# Patient Record
Sex: MALE | Race: WHITE | Employment: OTHER | URBAN - METROPOLITAN AREA
[De-identification: names, ages, dates, MRNs, and addresses within clinical notes are randomized per-mention and may not be internally consistent; named-entity substitution may affect disease eponyms.]

---

## 2019-01-30 ENCOUNTER — HOSPITAL ENCOUNTER (OUTPATIENT)
Age: 69
Setting detail: OUTPATIENT SURGERY
Discharge: HOME | End: 2019-01-30
Attending: INTERNAL MEDICINE | Admitting: INTERNAL MEDICINE
Payer: MEDICARE

## 2019-01-30 ENCOUNTER — ANESTHESIA (OUTPATIENT)
Dept: ENDOSCOPY | Age: 69
End: 2019-01-30
Payer: MEDICARE

## 2019-01-30 ENCOUNTER — ANESTHESIA EVENT (OUTPATIENT)
Dept: ENDOSCOPY | Age: 69
End: 2019-01-30
Payer: MEDICARE

## 2019-01-30 VITALS
HEART RATE: 66 BPM | DIASTOLIC BLOOD PRESSURE: 64 MMHG | BODY MASS INDEX: 29.58 KG/M2 | OXYGEN SATURATION: 96 % | RESPIRATION RATE: 23 BRPM | TEMPERATURE: 97.5 F | WEIGHT: 218.38 LBS | HEIGHT: 72 IN | SYSTOLIC BLOOD PRESSURE: 122 MMHG

## 2019-01-30 PROCEDURE — 76040000019: Performed by: INTERNAL MEDICINE

## 2019-01-30 PROCEDURE — 74011250636 HC RX REV CODE- 250/636: Performed by: INTERNAL MEDICINE

## 2019-01-30 PROCEDURE — 74011250636 HC RX REV CODE- 250/636

## 2019-01-30 PROCEDURE — 76060000031 HC ANESTHESIA FIRST 0.5 HR: Performed by: INTERNAL MEDICINE

## 2019-01-30 RX ORDER — LIDOCAINE HYDROCHLORIDE 20 MG/ML
INJECTION, SOLUTION EPIDURAL; INFILTRATION; INTRACAUDAL; PERINEURAL AS NEEDED
Status: DISCONTINUED | OUTPATIENT
Start: 2019-01-30 | End: 2019-01-30 | Stop reason: HOSPADM

## 2019-01-30 RX ORDER — PROPOFOL 10 MG/ML
INJECTION, EMULSION INTRAVENOUS AS NEEDED
Status: DISCONTINUED | OUTPATIENT
Start: 2019-01-30 | End: 2019-01-30 | Stop reason: HOSPADM

## 2019-01-30 RX ORDER — FLUMAZENIL 0.1 MG/ML
0.2 INJECTION INTRAVENOUS
Status: CANCELLED | OUTPATIENT
Start: 2019-01-30 | End: 2019-01-30

## 2019-01-30 RX ORDER — SODIUM CHLORIDE 0.9 % (FLUSH) 0.9 %
5-40 SYRINGE (ML) INJECTION EVERY 8 HOURS
Status: CANCELLED | OUTPATIENT
Start: 2019-01-30

## 2019-01-30 RX ORDER — SODIUM CHLORIDE 0.9 % (FLUSH) 0.9 %
5-40 SYRINGE (ML) INJECTION AS NEEDED
Status: CANCELLED | OUTPATIENT
Start: 2019-01-30

## 2019-01-30 RX ORDER — SODIUM CHLORIDE 0.9 % (FLUSH) 0.9 %
5-40 SYRINGE (ML) INJECTION EVERY 8 HOURS
Status: DISCONTINUED | OUTPATIENT
Start: 2019-01-30 | End: 2019-01-30 | Stop reason: HOSPADM

## 2019-01-30 RX ORDER — FENTANYL CITRATE 50 UG/ML
50 INJECTION, SOLUTION INTRAMUSCULAR; INTRAVENOUS
Status: CANCELLED | OUTPATIENT
Start: 2019-01-30 | End: 2019-01-30

## 2019-01-30 RX ORDER — SODIUM CHLORIDE 0.9 % (FLUSH) 0.9 %
5-40 SYRINGE (ML) INJECTION AS NEEDED
Status: DISCONTINUED | OUTPATIENT
Start: 2019-01-30 | End: 2019-01-30 | Stop reason: HOSPADM

## 2019-01-30 RX ORDER — SODIUM CHLORIDE 9 MG/ML
INJECTION, SOLUTION INTRAVENOUS
Status: DISCONTINUED | OUTPATIENT
Start: 2019-01-30 | End: 2019-01-30 | Stop reason: HOSPADM

## 2019-01-30 RX ORDER — CARVEDILOL 12.5 MG/1
TABLET ORAL 2 TIMES DAILY WITH MEALS
COMMUNITY

## 2019-01-30 RX ORDER — FLECAINIDE ACETATE 100 MG/1
100 TABLET ORAL 2 TIMES DAILY
COMMUNITY

## 2019-01-30 RX ORDER — SODIUM CHLORIDE 9 MG/ML
100 INJECTION, SOLUTION INTRAVENOUS CONTINUOUS
Status: DISCONTINUED | OUTPATIENT
Start: 2019-01-30 | End: 2019-01-30 | Stop reason: HOSPADM

## 2019-01-30 RX ORDER — NALOXONE HYDROCHLORIDE 0.4 MG/ML
0.4 INJECTION, SOLUTION INTRAMUSCULAR; INTRAVENOUS; SUBCUTANEOUS
Status: CANCELLED | OUTPATIENT
Start: 2019-01-30 | End: 2019-01-30

## 2019-01-30 RX ORDER — FLUMAZENIL 0.1 MG/ML
0.2 INJECTION INTRAVENOUS
Status: DISCONTINUED | OUTPATIENT
Start: 2019-01-30 | End: 2019-01-30 | Stop reason: HOSPADM

## 2019-01-30 RX ORDER — ATROPINE SULFATE 0.1 MG/ML
0.5 INJECTION INTRAVENOUS
Status: DISCONTINUED | OUTPATIENT
Start: 2019-01-30 | End: 2019-01-30 | Stop reason: HOSPADM

## 2019-01-30 RX ORDER — MIDAZOLAM HYDROCHLORIDE 1 MG/ML
.25-5 INJECTION, SOLUTION INTRAMUSCULAR; INTRAVENOUS
Status: CANCELLED | OUTPATIENT
Start: 2019-01-30 | End: 2019-01-30

## 2019-01-30 RX ORDER — DEXTROMETHORPHAN/PSEUDOEPHED 2.5-7.5/.8
1.2 DROPS ORAL
Status: DISCONTINUED | OUTPATIENT
Start: 2019-01-30 | End: 2019-01-30 | Stop reason: HOSPADM

## 2019-01-30 RX ADMIN — SODIUM CHLORIDE: 9 INJECTION, SOLUTION INTRAVENOUS at 10:59

## 2019-01-30 RX ADMIN — LIDOCAINE HYDROCHLORIDE 50 MG: 20 INJECTION, SOLUTION EPIDURAL; INFILTRATION; INTRACAUDAL; PERINEURAL at 11:03

## 2019-01-30 RX ADMIN — PROPOFOL 40 MG: 10 INJECTION, EMULSION INTRAVENOUS at 11:09

## 2019-01-30 RX ADMIN — PROPOFOL 30 MG: 10 INJECTION, EMULSION INTRAVENOUS at 11:11

## 2019-01-30 RX ADMIN — SODIUM CHLORIDE 100 ML/HR: 900 INJECTION, SOLUTION INTRAVENOUS at 10:58

## 2019-01-30 RX ADMIN — PROPOFOL 60 MG: 10 INJECTION, EMULSION INTRAVENOUS at 11:07

## 2019-01-30 RX ADMIN — PROPOFOL 70 MG: 10 INJECTION, EMULSION INTRAVENOUS at 11:04

## 2019-01-30 RX ADMIN — PROPOFOL 30 MG: 10 INJECTION, EMULSION INTRAVENOUS at 11:06

## 2019-01-30 RX ADMIN — PROPOFOL 30 MG: 10 INJECTION, EMULSION INTRAVENOUS at 11:14

## 2019-01-30 NOTE — PROGRESS NOTES
..Anesthesia reports 260mg Propofol, 5mg Lidocaine and 400mL NS given during procedure. Received report from anesthesia staff on vital signs and status of patient.

## 2019-01-30 NOTE — ANESTHESIA POSTPROCEDURE EVALUATION
Procedure(s): 
COLONOSCOPY. Anesthesia Post Evaluation Patient location during evaluation: PACU Note status: Adequate. Level of consciousness: responsive to verbal stimuli and sleepy but conscious Pain management: satisfactory to patient Airway patency: patent Anesthetic complications: no 
Cardiovascular status: acceptable Respiratory status: acceptable Hydration status: acceptable Comments: +Post-Anesthesia Evaluation and Assessment Patient: Brandy Butler MRN: [id number]  SSN: [id number] YOB: 1950  Age: 70 y.o. Sex: male Cardiovascular Function/Vital Signs /64   Pulse 66   Temp 36.4 °C (97.5 °F)   Resp 23   Ht 6' (1.829 m)   Wt 99.1 kg (218 lb 6 oz)   SpO2 96%   BMI 29.62 kg/m² Patient is status post Procedure(s): 
COLONOSCOPY. Nausea/Vomiting: Controlled. Postoperative hydration reviewed and adequate. Pain: 
Pain Scale 1: Numeric (0 - 10) (01/30/19 1141) Pain Intensity 1: 0 (01/30/19 1141) Managed. Neurological Status: At baseline. Mental Status and Level of Consciousness: Arousable. Pulmonary Status:  
O2 Device: Room air (01/30/19 1143) Adequate oxygenation and airway patent. Complications related to anesthesia: None Post-anesthesia assessment completed. No concerns. Signed By: Luz Hemphill MD  
 1/30/2019 Post anesthesia nausea and vomiting:  controlled Visit Vitals /64 Pulse 66 Temp 36.4 °C (97.5 °F) Resp 23 Ht 6' (1.829 m) Wt 99.1 kg (218 lb 6 oz) SpO2 96% BMI 29.62 kg/m²

## 2019-01-30 NOTE — DISCHARGE INSTRUCTIONS
Arslan Toribio MD  [facility], [address]  [place], [address] [facility]  [phone]  [email or web address]  [id number]  1950    COLON DISCHARGE INSTRUCTIONS  Discomfort:  Redness at IV site- apply warm compress to area; if redness or soreness persist- contact your physician  There may be a slight amount of blood passed from the rectum  Gaseous discomfort- walking, belching will help relieve any discomfort  You may not operate a vehicle for 12 hours  You may not engage in an occupation involving machinery or appliances for rest of today  You may not drink alcoholic beverages for at least 12 hours  Avoid making any critical decisions for at least 24 hour  DIET:   High fiber diet. - however -  remember your colon is empty and a heavy meal will produce gas. Avoid these foods:  vegetables, fried / greasy foods, carbonated drinks for today      ACTIVITY:  You may resume your normal daily activities it is recommended that you spend the remainder of the day resting -  avoid any strenuous activity. CALL M.D. ANY SIGN OF:   Increasing pain, nausea, vomiting  Abdominal distension (swelling)  New increased bleeding (oral or rectal)  Fever (chills)     COLONOSCOPY FINDINGS:  Your colonoscopy showed: diverticulosis and hemorrhoids. NO polyps found. Follow-up Instructions:   Call Dr. Arslan Toribio if any questions or problems. Telephone # [phone]    Should have a repeat colonoscopy in 5 years.

## 2019-01-30 NOTE — ROUTINE PROCESS
Rae Kaur Olaf 1950 
[id number] Situation: 
Verbal report received from: Ann Marie Duncan RN Procedure: Procedure(s): 
COLONOSCOPY Background: 
 
Preoperative diagnosis: ANTICOAGULANT LONG TERM USE, FAMILY HISTORY GI TRACT CANCER Postoperative diagnosis: diverticulosis :  Dr. Cassi Seals Assistant(s): Endoscopy Technician-1: [facility] Endoscopy RN-1: Hayes Purcell RN Specimens: * No specimens in log * H. Pylori  no Assessment: 
Intra-procedure medications Anesthesia gave intra-procedure sedation and medications, see anesthesia flow sheet Intravenous fluids: NS@ [facility] Vital signs stable Abdominal assessment: round and soft Recommendation: 
Discharge patient per MD order. Family or Friend Permission to share finding with family or friend yes

## 2019-01-30 NOTE — PROCEDURES
[facility]                  Colonoscopy Operative Report    1/30/2019      Antonio Dickens  [id number]  1950    Procedure Type:   Colonoscopy --screening     Indications:    Family history of coloretal cancer (screening only)     Pre-operative Diagnosis: see indication above    Post-operative Diagnosis:  See findings below    :  Alexia Kent MD    Referring Provider: Russel Baez MD      Sedation:  MAC anesthesia Propofol    Pre-Procedural Exam:      Airway: clear,  No airway problems anticipated  Heart: RRR, without gallops or rubs  Lungs: clear bilaterally without wheezes, crackles, or rhonchi  Abdomen: soft, nontender, nondistended, bowel sounds present  Mental Status: awake, alert and oriented to person, place and time     Procedure Details:  After informed consent was obtained with all risks and benefits of procedure explained and preoperative exam completed, the patient was taken to the endoscopy suite and placed in the left lateral decubitus position. Upon sequential sedation as per above, a digital rectal exam was performed . The Olympus videocolonoscope  was inserted in the rectum and carefully advanced to the cecum, which was identified by the ileocecal valve and appendiceal orifice. The cecum was identified by the ileocecal valve and appendiceal orifice. The quality of preparation was good. The colonoscope was slowly withdrawn with careful evaluation between folds. Retroflexion in the rectum was completed demonstrating internal hemorrhoids. Findings:   Rectum: Grade 2 internal hemorrhoid(s); Sigmoid:     - Diverticulosis  Descending Colon:     - Diverticulosis  Transverse Colon:     - Diverticulosis  Ascending Colon:     - Diverticulosis  Cecum: normal  Terminal Ileum: not intubated      Specimen Removed:  none    Complications: None. EBL:  None.     Impression:    diverticulosis,  Severe in degree, involving the entire colon  hemorrhoids internal, Large in size    Recommendations: --Repeat colonoscopy in 5 years. High fiber diet. Resume normal medication(s). Discharge Disposition:  Home in the company of a  when able to ambulate. Tremaine Gaitan MD    1/30/2019     ALFREDA Beyer MD  [facility], [address] [facility]  [address]  [phone]  [email or web address]

## 2019-01-30 NOTE — H&P
Marta Petty MD 
[facility], [address] [place] [address], [address] 
[phone] 
[email or web address] Outpatient History and Physical 
 
Patient: Maya Alvarado Physician: Porfirio Flores MD 
 
Vital Signs: Blood pressure 167/84, pulse 69, temperature 97.2 °F (36.2 °C), resp. rate 18, height 6' (1.829 m), weight 99.1 kg (218 lb 6 oz), SpO2 96 %. Allergies: Allergies Allergen Reactions  Amiodarone Unknown (comments) Chief Complaint: Screening colonoscopy History of Present Illness: Here for a screening colonoscopy. Last colonoscopy was  and showed no polyps. Currently has no GI symptoms. Positive FH of colon cancer in his father. History: 
Past Medical History:  
Diagnosis Date  A-fib ([address]  Cancer ([facility] [address] skin ca  Diabetes ([facility] [address]  GERD (gastroesophageal reflux disease)  Gout  High cholesterol  Hypertension  Obesity  Unspecified sleep apnea POSSIBLE SLEEP APNEA Past Surgical History:  
Procedure Laterality Date  CARDIAC SURG PROCEDURE UNLIST    
 cardiac ablation  HX CATARACT REMOVAL    
 BILATERAL  
 HX GI    
 esophageal dilitation  HX GI    
 COLONSCOPY  
 HX HERNIA REPAIR UMBILICAL HERNIA  
 HX TONSILLECTOMY  UPPER GI ENDOSCOPY,BIOPSY  2015 Social History Socioeconomic History  Marital status:  Spouse name: Not on file  Number of children: Not on file  Years of education: Not on file  Highest education level: Not on file Tobacco Use  Smoking status: Former Smoker Packs/day: 1.50 Years: 25.00 Pack years: 37.50 Last attempt to quit: 1998 Years since quittin.5  Smokeless tobacco: Never Used Substance and Sexual Activity  Alcohol use: Yes Comment: OCCASIONAL BEER  
 Drug use: No  
  
Family History Problem Relation Age of Onset  Heart Disease Mother CAD,STENT  
 Hypertension Mother  Cancer Father STOMACH CA, COLON CA, SKIN CA  
 Diabetes Father  Heart Disease Father CABG  Hypertension Father  Hypertension Sister  Stroke Paternal Grandmother Patient Active Problem List  
Diagnosis Code  
 HTN (hypertension) I10  
 HLD (hyperlipidemia) E78.5  Gout M10.9  Esophageal reflux K21.9  BRBPR (bright red blood per rectum) K62.5  Diverticulosis of colon with hemorrhage K57.31  
 Acute blood loss anemia D62  Atrial fibrillation, persistent (HCC) I48.1 Medications:  
Prior to Admission medications Medication Sig Start Date End Date Taking? Authorizing Provider  
carvedilol (COREG) 12.5 mg tablet Take  by mouth two (2) times daily (with meals). Yes Provider, Historical  
flecainide (TAMBOCOR) 100 mg tablet Take 100 mg by mouth two (2) times a day. Yes Provider, Historical  
atorvastatin (LIPITOR) 40 mg tablet Take  by mouth daily. Yes Provider, Historical  
irbesartan (AVAPRO) 300 mg tablet Take 300 mg by mouth daily. Yes Other, MD Marianne  
allopurinol (ZYLOPRIM) 300 mg tablet Take 300 mg by mouth daily. Yes Other, MD Marianne  
omeprazole (PRILOSEC) 20 mg capsule Take 20 mg by mouth daily. Yes Other, MD Marianne  
metFORMIN (GLUCOPHAGE) 500 mg tablet Take 500 mg by mouth two (2) times daily (with meals). Yes Other, MD Marianne  
pioglitazone (ACTOS) 15 mg tablet Take 15 mg by mouth nightly. Yes Provider, Historical  
multivitamin (MULTIPLE VITAMIN) tablet Take 1 Tab by mouth daily. Yes Provider, Historical  
oxyCODONE-acetaminophen (PERCOCET 7.5) 7.5-325 mg per tablet Take 1 Tab by mouth every six (6) hours as needed. Max Daily Amount: 4 Tabs. 3/4/16   Richy Marie MD  
warfarin (COUMADIN) 4 mg tablet Take 4 mg by mouth daily. Provider, Historical  
 
 
Physical Exam:  
 
General: well developed, well nourished HEENT: unremarkable Heart: regular rhythm no mumur Lungs: clear Abdominal:  benign Neurological: unremarkable Extremities: no edema Findings/Diagnosis: Screening colonoscopy. FH of colon cancer. Plan of Care/Planned Procedure: Colonoscopy with monitored anesthesia care sedation Signed: 
Brian Ramirez MD 1/30/2019

## 2024-09-10 ENCOUNTER — HOSPITAL ENCOUNTER (OUTPATIENT)
Facility: HOSPITAL | Age: 74
Discharge: HOME | End: 2024-09-13

## 2024-09-10 DIAGNOSIS — G44.86 CERVICOGENIC HEADACHE: ICD-10-CM

## 2024-09-10 DIAGNOSIS — M54.2 CERVICALGIA: ICD-10-CM

## 2024-09-10 DIAGNOSIS — M50.30 DDD (DEGENERATIVE DISC DISEASE), CERVICAL: ICD-10-CM

## 2024-10-06 ENCOUNTER — HOSPITAL ENCOUNTER (OUTPATIENT)
Facility: HOSPITAL | Age: 74
Discharge: HOME | End: 2024-10-09
Payer: MEDICARE

## 2024-10-06 DIAGNOSIS — G44.86 CERVICOGENIC HEADACHE: ICD-10-CM

## 2024-10-06 DIAGNOSIS — M50.30 DDD (DEGENERATIVE DISC DISEASE), CERVICAL: ICD-10-CM

## 2024-10-06 DIAGNOSIS — M54.2 CERVICALGIA: ICD-10-CM

## 2024-10-06 DIAGNOSIS — M50.30 OTHER CERVICAL DISC DEGENERATION, UNSPECIFIED CERVICAL REGION: ICD-10-CM

## 2024-10-06 PROCEDURE — 72125 CT NECK SPINE W/O DYE: CPT

## 2025-02-04 ENCOUNTER — HOSPITAL ENCOUNTER (OUTPATIENT)
Facility: HOSPITAL | Age: 75
Discharge: HOME | End: 2025-02-07
Payer: MEDICARE

## 2025-02-04 DIAGNOSIS — M47.812 CERVICAL SPONDYLOSIS: ICD-10-CM

## 2025-02-04 PROCEDURE — 72141 MRI NECK SPINE W/O DYE: CPT

## 2025-04-10 ENCOUNTER — APPOINTMENT (OUTPATIENT)
Facility: HOSPITAL | Age: 75
DRG: 683 | End: 2025-04-10
Payer: MEDICARE

## 2025-04-10 ENCOUNTER — HOSPITAL ENCOUNTER (EMERGENCY)
Facility: HOSPITAL | Age: 75
Discharge: HOME | DRG: 683 | End: 2025-04-10
Attending: STUDENT IN AN ORGANIZED HEALTH CARE EDUCATION/TRAINING PROGRAM
Payer: MEDICARE

## 2025-04-10 VITALS
HEART RATE: 70 BPM | SYSTOLIC BLOOD PRESSURE: 165 MMHG | OXYGEN SATURATION: 93 % | WEIGHT: 225.09 LBS | RESPIRATION RATE: 20 BRPM | BODY MASS INDEX: 30.49 KG/M2 | HEIGHT: 72 IN | TEMPERATURE: 98.6 F | DIASTOLIC BLOOD PRESSURE: 76 MMHG

## 2025-04-10 DIAGNOSIS — K44.9 HIATAL HERNIA: ICD-10-CM

## 2025-04-10 DIAGNOSIS — R11.0 NAUSEA: Primary | ICD-10-CM

## 2025-04-10 LAB
ALBUMIN SERPL-MCNC: 3.7 G/DL (ref 3.5–5)
ALBUMIN/GLOB SERPL: 0.9 (ref 1.1–2.2)
ALP SERPL-CCNC: 83 U/L (ref 45–117)
ALT SERPL-CCNC: 31 U/L (ref 12–78)
ANION GAP SERPL CALC-SCNC: 4 MMOL/L (ref 2–12)
APPEARANCE UR: CLEAR
AST SERPL-CCNC: 28 U/L (ref 15–37)
BACTERIA URNS QL MICRO: NEGATIVE /HPF
BASOPHILS # BLD: 0.02 K/UL (ref 0–0.1)
BASOPHILS NFR BLD: 0.4 % (ref 0–1)
BILIRUB SERPL-MCNC: 0.7 MG/DL (ref 0.2–1)
BILIRUB UR QL: NEGATIVE
BUN SERPL-MCNC: 14 MG/DL (ref 6–20)
BUN/CREAT SERPL: 11 (ref 12–20)
CALCIUM SERPL-MCNC: 9.6 MG/DL (ref 8.5–10.1)
CHLORIDE SERPL-SCNC: 99 MMOL/L (ref 97–108)
CO2 SERPL-SCNC: 29 MMOL/L (ref 21–32)
COLOR UR: ABNORMAL
CREAT SERPL-MCNC: 1.32 MG/DL (ref 0.7–1.3)
DIFFERENTIAL METHOD BLD: ABNORMAL
EOSINOPHIL # BLD: 0.08 K/UL (ref 0–0.4)
EOSINOPHIL NFR BLD: 1.4 % (ref 0–7)
EPITH CASTS URNS QL MICRO: ABNORMAL /LPF
ERYTHROCYTE [DISTWIDTH] IN BLOOD BY AUTOMATED COUNT: 13.9 % (ref 11.5–14.5)
GLOBULIN SER CALC-MCNC: 3.9 G/DL (ref 2–4)
GLUCOSE SERPL-MCNC: 121 MG/DL (ref 65–100)
GLUCOSE UR STRIP.AUTO-MCNC: NEGATIVE MG/DL
HCT VFR BLD AUTO: 35.7 % (ref 36.6–50.3)
HGB BLD-MCNC: 11.4 G/DL (ref 12.1–17)
HGB UR QL STRIP: NEGATIVE
HYALINE CASTS URNS QL MICRO: ABNORMAL /LPF (ref 0–2)
IMM GRANULOCYTES # BLD AUTO: 0.02 K/UL (ref 0–0.04)
IMM GRANULOCYTES NFR BLD AUTO: 0.4 % (ref 0–0.5)
KETONES UR QL STRIP.AUTO: NEGATIVE MG/DL
LEUKOCYTE ESTERASE UR QL STRIP.AUTO: NEGATIVE
LIPASE SERPL-CCNC: 19 U/L (ref 13–75)
LYMPHOCYTES # BLD: 1.09 K/UL (ref 0.8–3.5)
LYMPHOCYTES NFR BLD: 19.2 % (ref 12–49)
MAGNESIUM SERPL-MCNC: 1.6 MG/DL (ref 1.6–2.4)
MCH RBC QN AUTO: 30.1 PG (ref 26–34)
MCHC RBC AUTO-ENTMCNC: 31.9 G/DL (ref 30–36.5)
MCV RBC AUTO: 94.2 FL (ref 80–99)
MONOCYTES # BLD: 0.36 K/UL (ref 0–1)
MONOCYTES NFR BLD: 6.3 % (ref 5–13)
NEUTS SEG # BLD: 4.11 K/UL (ref 1.8–8)
NEUTS SEG NFR BLD: 72.3 % (ref 32–75)
NITRITE UR QL STRIP.AUTO: NEGATIVE
NRBC # BLD: 0 K/UL (ref 0–0.01)
NRBC BLD-RTO: 0 PER 100 WBC
PH UR STRIP: 7 (ref 5–8)
PLATELET # BLD AUTO: 179 K/UL (ref 150–400)
PMV BLD AUTO: 10.7 FL (ref 8.9–12.9)
POTASSIUM SERPL-SCNC: 3.9 MMOL/L (ref 3.5–5.1)
PROT SERPL-MCNC: 7.6 G/DL (ref 6.4–8.2)
PROT UR STRIP-MCNC: 100 MG/DL
RBC # BLD AUTO: 3.79 M/UL (ref 4.1–5.7)
RBC #/AREA URNS HPF: ABNORMAL /HPF (ref 0–5)
SODIUM SERPL-SCNC: 132 MMOL/L (ref 136–145)
SP GR UR REFRACTOMETRY: 1.01
URINE CULTURE IF INDICATED: ABNORMAL
UROBILINOGEN UR QL STRIP.AUTO: 1 EU/DL (ref 0.2–1)
WBC # BLD AUTO: 5.7 K/UL (ref 4.1–11.1)
WBC URNS QL MICRO: ABNORMAL /HPF (ref 0–4)

## 2025-04-10 PROCEDURE — 6360000002 HC RX W HCPCS: Performed by: STUDENT IN AN ORGANIZED HEALTH CARE EDUCATION/TRAINING PROGRAM

## 2025-04-10 PROCEDURE — 83690 ASSAY OF LIPASE: CPT

## 2025-04-10 PROCEDURE — 74177 CT ABD & PELVIS W/CONTRAST: CPT

## 2025-04-10 PROCEDURE — 81001 URINALYSIS AUTO W/SCOPE: CPT

## 2025-04-10 PROCEDURE — 83735 ASSAY OF MAGNESIUM: CPT

## 2025-04-10 PROCEDURE — 85025 COMPLETE CBC W/AUTO DIFF WBC: CPT

## 2025-04-10 PROCEDURE — 80053 COMPREHEN METABOLIC PANEL: CPT

## 2025-04-10 PROCEDURE — 36415 COLL VENOUS BLD VENIPUNCTURE: CPT

## 2025-04-10 PROCEDURE — 6360000004 HC RX CONTRAST MEDICATION: Performed by: STUDENT IN AN ORGANIZED HEALTH CARE EDUCATION/TRAINING PROGRAM

## 2025-04-10 RX ORDER — IOPAMIDOL 755 MG/ML
100 INJECTION, SOLUTION INTRAVASCULAR
Status: COMPLETED | OUTPATIENT
Start: 2025-04-10 | End: 2025-04-10

## 2025-04-10 RX ORDER — ONDANSETRON 4 MG/1
4 TABLET, ORALLY DISINTEGRATING ORAL 3 TIMES DAILY PRN
Qty: 21 TABLET | Refills: 0 | Status: SHIPPED | OUTPATIENT
Start: 2025-04-10

## 2025-04-10 RX ORDER — ONDANSETRON 2 MG/ML
4 INJECTION INTRAMUSCULAR; INTRAVENOUS ONCE
Status: COMPLETED | OUTPATIENT
Start: 2025-04-10 | End: 2025-04-10

## 2025-04-10 RX ADMIN — IOPAMIDOL 100 ML: 755 INJECTION, SOLUTION INTRAVENOUS at 11:59

## 2025-04-10 RX ADMIN — ONDANSETRON 4 MG: 2 INJECTION, SOLUTION INTRAMUSCULAR; INTRAVENOUS at 12:17

## 2025-04-10 ASSESSMENT — LIFESTYLE VARIABLES
HOW OFTEN DO YOU HAVE A DRINK CONTAINING ALCOHOL: NEVER
HOW MANY STANDARD DRINKS CONTAINING ALCOHOL DO YOU HAVE ON A TYPICAL DAY: PATIENT DOES NOT DRINK

## 2025-04-10 ASSESSMENT — PAIN SCALES - GENERAL: PAINLEVEL_OUTOF10: 0

## 2025-04-12 ENCOUNTER — HOSPITAL ENCOUNTER (INPATIENT)
Facility: HOSPITAL | Age: 75
LOS: 3 days | Discharge: HOME | DRG: 683 | End: 2025-04-15
Attending: STUDENT IN AN ORGANIZED HEALTH CARE EDUCATION/TRAINING PROGRAM | Admitting: STUDENT IN AN ORGANIZED HEALTH CARE EDUCATION/TRAINING PROGRAM
Payer: MEDICARE

## 2025-04-12 DIAGNOSIS — R63.8 DECREASED ORAL INTAKE: ICD-10-CM

## 2025-04-12 DIAGNOSIS — N17.9 AKI (ACUTE KIDNEY INJURY): Primary | ICD-10-CM

## 2025-04-12 DIAGNOSIS — Z87.19 HISTORY OF HIATAL HERNIA: ICD-10-CM

## 2025-04-12 DIAGNOSIS — E11.65 TYPE 2 DIABETES MELLITUS WITH HYPERGLYCEMIA, WITHOUT LONG-TERM CURRENT USE OF INSULIN (HCC): ICD-10-CM

## 2025-04-12 DIAGNOSIS — E87.1 HYPONATREMIA: ICD-10-CM

## 2025-04-12 DIAGNOSIS — E87.8 HYPOCHLOREMIA: ICD-10-CM

## 2025-04-12 DIAGNOSIS — I48.0 PAROXYSMAL ATRIAL FIBRILLATION (HCC): ICD-10-CM

## 2025-04-12 DIAGNOSIS — R11.0 INTRACTABLE NAUSEA: ICD-10-CM

## 2025-04-12 LAB
ALBUMIN SERPL-MCNC: 3.8 G/DL (ref 3.5–5)
ALBUMIN/GLOB SERPL: 1.2 (ref 1.1–2.2)
ALP SERPL-CCNC: 89 U/L (ref 45–117)
ALT SERPL-CCNC: 25 U/L (ref 12–78)
ANION GAP SERPL CALC-SCNC: 7 MMOL/L (ref 2–12)
ANION GAP SERPL CALC-SCNC: 8 MMOL/L (ref 2–12)
AST SERPL-CCNC: 24 U/L (ref 15–37)
BASOPHILS # BLD: 0.02 K/UL (ref 0–0.1)
BASOPHILS NFR BLD: 0.3 % (ref 0–1)
BILIRUB SERPL-MCNC: 0.9 MG/DL (ref 0.2–1)
BUN SERPL-MCNC: 25 MG/DL (ref 6–20)
BUN SERPL-MCNC: 25 MG/DL (ref 6–20)
BUN/CREAT SERPL: 7 (ref 12–20)
BUN/CREAT SERPL: 7 (ref 12–20)
CALCIUM SERPL-MCNC: 8.8 MG/DL (ref 8.5–10.1)
CALCIUM SERPL-MCNC: 8.9 MG/DL (ref 8.5–10.1)
CHLORIDE SERPL-SCNC: 89 MMOL/L (ref 97–108)
CHLORIDE SERPL-SCNC: 94 MMOL/L (ref 97–108)
CO2 SERPL-SCNC: 24 MMOL/L (ref 21–32)
CO2 SERPL-SCNC: 28 MMOL/L (ref 21–32)
CREAT SERPL-MCNC: 3.66 MG/DL (ref 0.7–1.3)
CREAT SERPL-MCNC: 3.68 MG/DL (ref 0.7–1.3)
DIFFERENTIAL METHOD BLD: ABNORMAL
EKG ATRIAL RATE: 68 BPM
EKG DIAGNOSIS: NORMAL
EKG P AXIS: 81 DEGREES
EKG P-R INTERVAL: 286 MS
EKG Q-T INTERVAL: 490 MS
EKG QRS DURATION: 180 MS
EKG QTC CALCULATION (BAZETT): 521 MS
EKG R AXIS: 74 DEGREES
EKG T AXIS: 24 DEGREES
EKG VENTRICULAR RATE: 68 BPM
EOSINOPHIL # BLD: 0.06 K/UL (ref 0–0.4)
EOSINOPHIL NFR BLD: 0.8 % (ref 0–7)
ERYTHROCYTE [DISTWIDTH] IN BLOOD BY AUTOMATED COUNT: 13.6 % (ref 11.5–14.5)
GLOBULIN SER CALC-MCNC: 3.2 G/DL (ref 2–4)
GLUCOSE BLD STRIP.AUTO-MCNC: 126 MG/DL (ref 65–117)
GLUCOSE SERPL-MCNC: 113 MG/DL (ref 65–100)
GLUCOSE SERPL-MCNC: 115 MG/DL (ref 65–100)
HCT VFR BLD AUTO: 33.8 % (ref 36.6–50.3)
HGB BLD-MCNC: 11.1 G/DL (ref 12.1–17)
IMM GRANULOCYTES # BLD AUTO: 0.03 K/UL (ref 0–0.04)
IMM GRANULOCYTES NFR BLD AUTO: 0.4 % (ref 0–0.5)
INR PPP: 1.2 (ref 0.9–1.1)
LACTATE BLD-SCNC: 0.51 MMOL/L (ref 0.4–2)
LIPASE SERPL-CCNC: 19 U/L (ref 13–75)
LYMPHOCYTES # BLD: 0.89 K/UL (ref 0.8–3.5)
LYMPHOCYTES NFR BLD: 11.6 % (ref 12–49)
MCH RBC QN AUTO: 30.2 PG (ref 26–34)
MCHC RBC AUTO-ENTMCNC: 32.8 G/DL (ref 30–36.5)
MCV RBC AUTO: 92.1 FL (ref 80–99)
MONOCYTES # BLD: 0.47 K/UL (ref 0–1)
MONOCYTES NFR BLD: 6.1 % (ref 5–13)
NEUTS SEG # BLD: 6.21 K/UL (ref 1.8–8)
NEUTS SEG NFR BLD: 80.8 % (ref 32–75)
NRBC # BLD: 0 K/UL (ref 0–0.01)
NRBC BLD-RTO: 0 PER 100 WBC
OSMOLALITY SERPL: 273 MOSM/KG H2O
PLATELET # BLD AUTO: 162 K/UL (ref 150–400)
PMV BLD AUTO: 10.2 FL (ref 8.9–12.9)
POTASSIUM SERPL-SCNC: 4.2 MMOL/L (ref 3.5–5.1)
POTASSIUM SERPL-SCNC: 4.3 MMOL/L (ref 3.5–5.1)
PROT SERPL-MCNC: 7 G/DL (ref 6.4–8.2)
PROTHROMBIN TIME: 13 SEC (ref 9.2–11.2)
RBC # BLD AUTO: 3.67 M/UL (ref 4.1–5.7)
SERVICE CMNT-IMP: ABNORMAL
SODIUM SERPL-SCNC: 125 MMOL/L (ref 136–145)
SODIUM SERPL-SCNC: 125 MMOL/L (ref 136–145)
T4 FREE SERPL-MCNC: 1.5 NG/DL (ref 0.8–1.5)
TSH SERPL DL<=0.05 MIU/L-ACNC: 0.91 UIU/ML (ref 0.36–3.74)
WBC # BLD AUTO: 7.7 K/UL (ref 4.1–11.1)

## 2025-04-12 PROCEDURE — 2580000003 HC RX 258: Performed by: STUDENT IN AN ORGANIZED HEALTH CARE EDUCATION/TRAINING PROGRAM

## 2025-04-12 PROCEDURE — 6360000002 HC RX W HCPCS: Performed by: STUDENT IN AN ORGANIZED HEALTH CARE EDUCATION/TRAINING PROGRAM

## 2025-04-12 PROCEDURE — 84439 ASSAY OF FREE THYROXINE: CPT

## 2025-04-12 PROCEDURE — 36415 COLL VENOUS BLD VENIPUNCTURE: CPT

## 2025-04-12 PROCEDURE — 2500000003 HC RX 250 WO HCPCS: Performed by: STUDENT IN AN ORGANIZED HEALTH CARE EDUCATION/TRAINING PROGRAM

## 2025-04-12 PROCEDURE — 96374 THER/PROPH/DIAG INJ IV PUSH: CPT

## 2025-04-12 PROCEDURE — 83690 ASSAY OF LIPASE: CPT

## 2025-04-12 PROCEDURE — 6360000002 HC RX W HCPCS

## 2025-04-12 PROCEDURE — 82962 GLUCOSE BLOOD TEST: CPT

## 2025-04-12 PROCEDURE — 93005 ELECTROCARDIOGRAM TRACING: CPT

## 2025-04-12 PROCEDURE — 83930 ASSAY OF BLOOD OSMOLALITY: CPT

## 2025-04-12 PROCEDURE — 85025 COMPLETE CBC W/AUTO DIFF WBC: CPT

## 2025-04-12 PROCEDURE — 84443 ASSAY THYROID STIM HORMONE: CPT

## 2025-04-12 PROCEDURE — 2580000003 HC RX 258

## 2025-04-12 PROCEDURE — 80053 COMPREHEN METABOLIC PANEL: CPT

## 2025-04-12 PROCEDURE — 85610 PROTHROMBIN TIME: CPT

## 2025-04-12 PROCEDURE — 2060000000 HC ICU INTERMEDIATE R&B

## 2025-04-12 PROCEDURE — 99285 EMERGENCY DEPT VISIT HI MDM: CPT

## 2025-04-12 PROCEDURE — 83605 ASSAY OF LACTIC ACID: CPT

## 2025-04-12 PROCEDURE — 96375 TX/PRO/DX INJ NEW DRUG ADDON: CPT

## 2025-04-12 PROCEDURE — 6370000000 HC RX 637 (ALT 250 FOR IP): Performed by: STUDENT IN AN ORGANIZED HEALTH CARE EDUCATION/TRAINING PROGRAM

## 2025-04-12 PROCEDURE — 6370000000 HC RX 637 (ALT 250 FOR IP)

## 2025-04-12 RX ORDER — DEXTROSE MONOHYDRATE 100 MG/ML
INJECTION, SOLUTION INTRAVENOUS CONTINUOUS PRN
Status: DISCONTINUED | OUTPATIENT
Start: 2025-04-12 | End: 2025-04-15 | Stop reason: HOSPADM

## 2025-04-12 RX ORDER — ONDANSETRON 4 MG/1
4 TABLET, ORALLY DISINTEGRATING ORAL EVERY 8 HOURS PRN
Status: DISCONTINUED | OUTPATIENT
Start: 2025-04-12 | End: 2025-04-15 | Stop reason: HOSPADM

## 2025-04-12 RX ORDER — ACETAMINOPHEN 325 MG/1
650 TABLET ORAL EVERY 6 HOURS PRN
Status: DISCONTINUED | OUTPATIENT
Start: 2025-04-12 | End: 2025-04-15 | Stop reason: HOSPADM

## 2025-04-12 RX ORDER — 0.9 % SODIUM CHLORIDE 0.9 %
1000 INTRAVENOUS SOLUTION INTRAVENOUS ONCE
Status: DISCONTINUED | OUTPATIENT
Start: 2025-04-12 | End: 2025-04-12

## 2025-04-12 RX ORDER — POLYETHYLENE GLYCOL 3350 17 G/17G
17 POWDER, FOR SOLUTION ORAL DAILY PRN
Status: DISCONTINUED | OUTPATIENT
Start: 2025-04-12 | End: 2025-04-15 | Stop reason: HOSPADM

## 2025-04-12 RX ORDER — LABETALOL HYDROCHLORIDE 5 MG/ML
10 INJECTION, SOLUTION INTRAVENOUS ONCE
Status: COMPLETED | OUTPATIENT
Start: 2025-04-12 | End: 2025-04-12

## 2025-04-12 RX ORDER — LOSARTAN POTASSIUM 100 MG/1
100 TABLET ORAL DAILY
Status: DISCONTINUED | OUTPATIENT
Start: 2025-04-12 | End: 2025-04-15 | Stop reason: HOSPADM

## 2025-04-12 RX ORDER — ATORVASTATIN CALCIUM 40 MG/1
40 TABLET, FILM COATED ORAL NIGHTLY
Status: DISCONTINUED | OUTPATIENT
Start: 2025-04-12 | End: 2025-04-15 | Stop reason: HOSPADM

## 2025-04-12 RX ORDER — GLUCAGON 1 MG/ML
1 KIT INJECTION PRN
Status: DISCONTINUED | OUTPATIENT
Start: 2025-04-12 | End: 2025-04-15 | Stop reason: HOSPADM

## 2025-04-12 RX ORDER — ENOXAPARIN SODIUM 150 MG/ML
1 INJECTION SUBCUTANEOUS DAILY
Status: DISCONTINUED | OUTPATIENT
Start: 2025-04-12 | End: 2025-04-15 | Stop reason: HOSPADM

## 2025-04-12 RX ORDER — SODIUM CHLORIDE 9 MG/ML
INJECTION, SOLUTION INTRAVENOUS PRN
Status: DISCONTINUED | OUTPATIENT
Start: 2025-04-12 | End: 2025-04-15 | Stop reason: HOSPADM

## 2025-04-12 RX ORDER — ONDANSETRON 2 MG/ML
4 INJECTION INTRAMUSCULAR; INTRAVENOUS EVERY 6 HOURS PRN
Status: DISCONTINUED | OUTPATIENT
Start: 2025-04-12 | End: 2025-04-15 | Stop reason: HOSPADM

## 2025-04-12 RX ORDER — FLECAINIDE ACETATE 100 MG/1
100 TABLET ORAL EVERY 12 HOURS SCHEDULED
Status: DISCONTINUED | OUTPATIENT
Start: 2025-04-12 | End: 2025-04-13

## 2025-04-12 RX ORDER — ONDANSETRON 2 MG/ML
4 INJECTION INTRAMUSCULAR; INTRAVENOUS ONCE
Status: COMPLETED | OUTPATIENT
Start: 2025-04-12 | End: 2025-04-12

## 2025-04-12 RX ORDER — 0.9 % SODIUM CHLORIDE 0.9 %
1000 INTRAVENOUS SOLUTION INTRAVENOUS ONCE
Status: COMPLETED | OUTPATIENT
Start: 2025-04-12 | End: 2025-04-12

## 2025-04-12 RX ORDER — SODIUM CHLORIDE 9 MG/ML
INJECTION, SOLUTION INTRAVENOUS CONTINUOUS
Status: DISCONTINUED | OUTPATIENT
Start: 2025-04-12 | End: 2025-04-15 | Stop reason: HOSPADM

## 2025-04-12 RX ORDER — SODIUM CHLORIDE 0.9 % (FLUSH) 0.9 %
5-40 SYRINGE (ML) INJECTION EVERY 12 HOURS SCHEDULED
Status: DISCONTINUED | OUTPATIENT
Start: 2025-04-12 | End: 2025-04-15 | Stop reason: HOSPADM

## 2025-04-12 RX ORDER — CARVEDILOL 12.5 MG/1
25 TABLET ORAL 2 TIMES DAILY WITH MEALS
Status: DISCONTINUED | OUTPATIENT
Start: 2025-04-12 | End: 2025-04-15 | Stop reason: HOSPADM

## 2025-04-12 RX ORDER — ENOXAPARIN SODIUM 100 MG/ML
30 INJECTION SUBCUTANEOUS DAILY
Status: DISCONTINUED | OUTPATIENT
Start: 2025-04-12 | End: 2025-04-12

## 2025-04-12 RX ORDER — ALLOPURINOL 100 MG/1
300 TABLET ORAL DAILY
Status: DISCONTINUED | OUTPATIENT
Start: 2025-04-12 | End: 2025-04-15 | Stop reason: HOSPADM

## 2025-04-12 RX ORDER — ZOLPIDEM TARTRATE 5 MG/1
5 TABLET ORAL ONCE
Status: COMPLETED | OUTPATIENT
Start: 2025-04-12 | End: 2025-04-12

## 2025-04-12 RX ORDER — PROCHLORPERAZINE EDISYLATE 5 MG/ML
10 INJECTION INTRAMUSCULAR; INTRAVENOUS EVERY 6 HOURS PRN
Status: DISCONTINUED | OUTPATIENT
Start: 2025-04-12 | End: 2025-04-15 | Stop reason: HOSPADM

## 2025-04-12 RX ORDER — SODIUM CHLORIDE 0.9 % (FLUSH) 0.9 %
5-40 SYRINGE (ML) INJECTION PRN
Status: DISCONTINUED | OUTPATIENT
Start: 2025-04-12 | End: 2025-04-15 | Stop reason: HOSPADM

## 2025-04-12 RX ORDER — ACETAMINOPHEN 650 MG/1
650 SUPPOSITORY RECTAL EVERY 6 HOURS PRN
Status: DISCONTINUED | OUTPATIENT
Start: 2025-04-12 | End: 2025-04-15 | Stop reason: HOSPADM

## 2025-04-12 RX ORDER — INSULIN LISPRO 100 [IU]/ML
0-8 INJECTION, SOLUTION INTRAVENOUS; SUBCUTANEOUS
Status: DISCONTINUED | OUTPATIENT
Start: 2025-04-12 | End: 2025-04-15 | Stop reason: HOSPADM

## 2025-04-12 RX ADMIN — ATORVASTATIN CALCIUM 40 MG: 40 TABLET, FILM COATED ORAL at 21:16

## 2025-04-12 RX ADMIN — FLECAINIDE ACETATE 100 MG: 100 TABLET ORAL at 21:16

## 2025-04-12 RX ADMIN — SODIUM CHLORIDE, PRESERVATIVE FREE 10 ML: 5 INJECTION INTRAVENOUS at 20:36

## 2025-04-12 RX ADMIN — FAMOTIDINE 20 MG: 10 INJECTION, SOLUTION INTRAVENOUS at 16:05

## 2025-04-12 RX ADMIN — ACETAMINOPHEN 650 MG: 325 TABLET ORAL at 21:16

## 2025-04-12 RX ADMIN — NICARDIPINE HYDROCHLORIDE 5 MG/HR: 25 INJECTION INTRAVENOUS at 18:14

## 2025-04-12 RX ADMIN — SODIUM CHLORIDE 1000 ML: 0.9 INJECTION, SOLUTION INTRAVENOUS at 17:19

## 2025-04-12 RX ADMIN — PROCHLORPERAZINE EDISYLATE 10 MG: 5 INJECTION INTRAMUSCULAR; INTRAVENOUS at 19:30

## 2025-04-12 RX ADMIN — ZOLPIDEM TARTRATE 5 MG: 5 TABLET ORAL at 21:16

## 2025-04-12 RX ADMIN — ONDANSETRON 4 MG: 2 INJECTION, SOLUTION INTRAMUSCULAR; INTRAVENOUS at 20:58

## 2025-04-12 RX ADMIN — SODIUM CHLORIDE: 0.9 INJECTION, SOLUTION INTRAVENOUS at 18:16

## 2025-04-12 RX ADMIN — ENOXAPARIN SODIUM 105 MG: 150 INJECTION SUBCUTANEOUS at 21:16

## 2025-04-12 RX ADMIN — ONDANSETRON 4 MG: 2 INJECTION, SOLUTION INTRAMUSCULAR; INTRAVENOUS at 16:05

## 2025-04-12 RX ADMIN — LABETALOL HYDROCHLORIDE 10 MG: 5 INJECTION, SOLUTION INTRAVENOUS at 17:19

## 2025-04-12 RX ADMIN — LIDOCAINE HYDROCHLORIDE 40 ML: 20 SOLUTION ORAL at 17:19

## 2025-04-12 ASSESSMENT — PAIN DESCRIPTION - LOCATION
LOCATION: ABDOMEN
LOCATION: ABDOMEN
LOCATION: NECK

## 2025-04-12 ASSESSMENT — PAIN DESCRIPTION - ORIENTATION
ORIENTATION: MID

## 2025-04-12 ASSESSMENT — PAIN SCALES - GENERAL
PAINLEVEL_OUTOF10: 10
PAINLEVEL_OUTOF10: 5
PAINLEVEL_OUTOF10: 10
PAINLEVEL_OUTOF10: 0

## 2025-04-12 ASSESSMENT — LIFESTYLE VARIABLES
HOW MANY STANDARD DRINKS CONTAINING ALCOHOL DO YOU HAVE ON A TYPICAL DAY: PATIENT DOES NOT DRINK
HOW OFTEN DO YOU HAVE A DRINK CONTAINING ALCOHOL: NEVER

## 2025-04-12 ASSESSMENT — PAIN DESCRIPTION - DESCRIPTORS
DESCRIPTORS: ACHING
DESCRIPTORS: ACHING

## 2025-04-12 NOTE — H&P
Hospitalist Admission Note    NAME:   Abner Butler   : 1950   MRN: [id number]     Date/Time: 2025 5:44 PM    Patient PCP: Obi Dos Santos MD    ______________________________________________________________________  Given the patient's current clinical presentation, I have a high level of concern for decompensation if discharged from the emergency department.  Complex decision making was performed, which includes reviewing the patient's available past medical records, laboratory results, and x-ray films.       My assessment of this patient's clinical condition and my plan of care is as follows.    Assessment / Plan:  EVELIN likely due to dehydration/prerenal  Increased nausea   Has not been able to hydrate enough   Patient admitted to intermediate care with tele   Creatinine on arrival-3.66  Urinalysis done 4/10/2025-mild proteinuria  Will check urine urea, creatinine  Follow-up urine sodium  Check bladder scans  Ordered for normal saline maintenance infusion  Trend BMP every 6 hours for now  Avoid nephrotoxic agents renal dose medications    Hypertensive urgency on arrival  OF hypertension on irbesartan and carvedilol at home  Blood pressure 201/93  EKG on arrival normal sinus rhythm heart rate of 60 bpm first-degree heart block  Will start patient on nicardipine drip-titrate according to blood pressure goal less than 160 systolic  Once blood pressure is better controlled will start patient on maintenance medication  Will continue carvedilol-blood pressure at goal  Irbesartan-formulary substitution losartan currently held because of poor kidney function    Persistent nausea and vomiting  Possible gastroparesis from DM   Had recent ED visit for the same  CT scan of abdomen pelvis done 4/10/2025--no acute abnormality  Patient is planned for endoscopy and colonoscopy on 4/15/2025  Will start Compazine-mentioned Zofran did not help  MAY need GES     Acute hyponatremia likely due to  dilatation or wall thickening. STOMACH: Mild hiatal hernia. Posterior gastric fundal diverticulum. PELVIS: No pelvic lymphadenopathy or free fluid. BONES: Moderate degenerative changes in the spine VISUALIZED THORAX: No significant abnormality ADDITIONAL COMMENTS: N/A     1. No acute abnormality. Electronically signed by Sanjeev Cruz     _______________________________________________________________________    TOTAL TIME:  76 Minutes    Critical Care Provided     Minutes non procedure based    Signed: Jaxson Bowie MD    Procedures: see electronic medical records for all procedures/Xrays and details which were not copied into this note but were reviewed prior to creation of Plan.

## 2025-04-12 NOTE — ED PROVIDER NOTES
[facility] PROGRESSIVE CARE  EMERGENCY DEPARTMENT ENCOUNTER       Pt Name: Abner Butler  MRN: [id number]  Birthdate 1950  Date of evaluation: 4/12/2025  Provider: Kristen Ortiz PA-C   PCP: Obi Dos Santos MD  Note Started: 4:00 PM EDT 4/12/25     CHIEF COMPLAINT       Chief Complaint   Patient presents with    Abdominal Pain     Pt ambulatory to triage, reports he was seen Thursday and diagnosed with a hiatal hernia, reports worsening pain and nausea         HISTORY OF PRESENT ILLNESS: 1 or more elements      History From: Patient and Patient's Wife  HPI Limitations: None     Abner Butler is a 74 y.o. male who presents ambulatory to the emergency department for evaluation of nausea.  Patient seen in this emergency department on Thursday, had laboratory testing done and a CT scan performed, states he was diagnosed with a hiatal hernia and discharged home with Zofran.  Patient states that despite taking Zofran his nausea has persisted.  Despite triage note patient denies any pain whatsoever.  Patient has a colonoscopy and endoscopy scheduled for 4/15/2025, on Tuesday.  Patient expresses that he is primarily here today to get nausea medication that works better for him so that he can make it to appointment on Tuesday.  Reports 1 episode of emesis today, states he has been tolerating fluids such as water and Gatorade fine, denies any diarrhea or fevers.     Nursing Notes were all reviewed and agreed with or any disagreements were addressed in the HPI.     REVIEW OF SYSTEMS      Review of Systems     Positives and Pertinent negatives as per HPI.    PAST HISTORY     Past Medical History:  No past medical history on file.    Past Surgical History:  No past surgical history on file.    Family History:  No family history on file.    Social History:       Allergies:  No Known Allergies    CURRENT MEDICATIONS      Current Discharge Medication List        CONTINUE these medications which have NOT CHANGED    Details

## 2025-04-12 NOTE — ED PROVIDER NOTES
[facility] EMERGENCY DEPARTMENT  EMERGENCY DEPARTMENT ENCOUNTER       Pt Name: Abner Butler  MRN: [id number]  Birthdate 1950  Date of evaluation: 4/10/2025  Provider: Sohail Castillo MD   PCP: Obi Dos Santos MD  Note Started: 7:39 PM EDT 4/12/25     CHIEF COMPLAINT       Chief Complaint   Patient presents with    Nausea     Patient ambulatory to triage w c/o 6 months of nausea. He reports he is scheduled to have a colonoscopy and endoscopy on Tues. He reports his PCP states he is anemic and bleeding somewhere.        HISTORY OF PRESENT ILLNESS: 1 or more elements      History From: patient, History limited by: none     Abner Butler is a 74 y.o. male presenting with nausea.  This has been going on for about a year, on and off.  No clear cause.  Going to see GI in a few days but nausea affecting his sleep, not able to eat much anymore.  Denies diarrhea.  Mild epigastric pain occasionally.         Please See MDM for Additional Details of the HPI/PMH  Nursing Notes were all reviewed and agreed with or any disagreements were addressed in the HPI.     REVIEW OF SYSTEMS        Positives and Pertinent negatives as per HPI.    PAST HISTORY     Past Medical History:  No past medical history on file.    Past Surgical History:  No past surgical history on file.    Family History:  No family history on file.    Social History:       Allergies:  No Known Allergies    CURRENT MEDICATIONS      Discharge Medication List as of 4/10/2025  1:03 PM          SCREENINGS               No data recorded            PHYSICAL EXAM      ED Triage Vitals [04/10/25 0943]   Encounter Vitals Group      BP (!) 159/79      Systolic BP Percentile       Diastolic BP Percentile       Pulse 70      Respirations 20      Temp 98.6 °F (37 °C)      Temp src       SpO2 97 %      Weight - Scale 102.1 kg (225 lb 1.4 oz)      Height 1.829 m (6')      Head Circumference       Peak Flow       Pain Score       Pain Loc       Pain Education

## 2025-04-13 ENCOUNTER — APPOINTMENT (OUTPATIENT)
Facility: HOSPITAL | Age: 75
DRG: 683 | End: 2025-04-13
Payer: MEDICARE

## 2025-04-13 LAB
ANION GAP SERPL CALC-SCNC: 8 MMOL/L (ref 2–12)
ANION GAP SERPL CALC-SCNC: 8 MMOL/L (ref 2–12)
APPEARANCE UR: CLEAR
BACTERIA URNS QL MICRO: NEGATIVE /HPF
BASOPHILS # BLD: 0.02 K/UL (ref 0–0.1)
BASOPHILS NFR BLD: 0.3 % (ref 0–1)
BILIRUB UR QL: NEGATIVE
BUN SERPL-MCNC: 29 MG/DL (ref 6–20)
BUN SERPL-MCNC: 31 MG/DL (ref 6–20)
BUN/CREAT SERPL: 8 (ref 12–20)
BUN/CREAT SERPL: 8 (ref 12–20)
CALCIUM SERPL-MCNC: 8.4 MG/DL (ref 8.5–10.1)
CALCIUM SERPL-MCNC: 8.4 MG/DL (ref 8.5–10.1)
CHLORIDE SERPL-SCNC: 94 MMOL/L (ref 97–108)
CHLORIDE SERPL-SCNC: 94 MMOL/L (ref 97–108)
CO2 SERPL-SCNC: 23 MMOL/L (ref 21–32)
CO2 SERPL-SCNC: 24 MMOL/L (ref 21–32)
COLOR UR: ABNORMAL
CREAT SERPL-MCNC: 3.86 MG/DL (ref 0.7–1.3)
CREAT SERPL-MCNC: 4.09 MG/DL (ref 0.7–1.3)
CREAT UR-MCNC: 99.7 MG/DL
DIFFERENTIAL METHOD BLD: ABNORMAL
EOSINOPHIL # BLD: 0.05 K/UL (ref 0–0.4)
EOSINOPHIL NFR BLD: 0.7 % (ref 0–7)
EPITH CASTS URNS QL MICRO: ABNORMAL /LPF
ERYTHROCYTE [DISTWIDTH] IN BLOOD BY AUTOMATED COUNT: 13.4 % (ref 11.5–14.5)
GLUCOSE BLD STRIP.AUTO-MCNC: 100 MG/DL (ref 65–117)
GLUCOSE BLD STRIP.AUTO-MCNC: 120 MG/DL (ref 65–117)
GLUCOSE BLD STRIP.AUTO-MCNC: 121 MG/DL (ref 65–117)
GLUCOSE BLD STRIP.AUTO-MCNC: 129 MG/DL (ref 65–117)
GLUCOSE SERPL-MCNC: 116 MG/DL (ref 65–100)
GLUCOSE SERPL-MCNC: 93 MG/DL (ref 65–100)
GLUCOSE UR STRIP.AUTO-MCNC: NEGATIVE MG/DL
GRAN CASTS URNS QL MICRO: ABNORMAL /LPF
HCT VFR BLD AUTO: 30 % (ref 36.6–50.3)
HGB BLD-MCNC: 9.9 G/DL (ref 12.1–17)
HGB UR QL STRIP: NEGATIVE
IMM GRANULOCYTES # BLD AUTO: 0.03 K/UL (ref 0–0.04)
IMM GRANULOCYTES NFR BLD AUTO: 0.4 % (ref 0–0.5)
KETONES UR QL STRIP.AUTO: NEGATIVE MG/DL
LEUKOCYTE ESTERASE UR QL STRIP.AUTO: NEGATIVE
LYMPHOCYTES # BLD: 1.03 K/UL (ref 0.8–3.5)
LYMPHOCYTES NFR BLD: 14.1 % (ref 12–49)
MCH RBC QN AUTO: 30.2 PG (ref 26–34)
MCHC RBC AUTO-ENTMCNC: 33 G/DL (ref 30–36.5)
MCV RBC AUTO: 91.5 FL (ref 80–99)
MONOCYTES # BLD: 0.66 K/UL (ref 0–1)
MONOCYTES NFR BLD: 9 % (ref 5–13)
NEUTS SEG # BLD: 5.51 K/UL (ref 1.8–8)
NEUTS SEG NFR BLD: 75.5 % (ref 32–75)
NITRITE UR QL STRIP.AUTO: NEGATIVE
NRBC # BLD: 0 K/UL (ref 0–0.01)
NRBC BLD-RTO: 0 PER 100 WBC
OSMOLALITY UR: 286 MOSM/KG H2O
PH UR STRIP: 5 (ref 5–8)
PLATELET # BLD AUTO: 143 K/UL (ref 150–400)
PMV BLD AUTO: 10.3 FL (ref 8.9–12.9)
POTASSIUM SERPL-SCNC: 3.9 MMOL/L (ref 3.5–5.1)
POTASSIUM SERPL-SCNC: 4.4 MMOL/L (ref 3.5–5.1)
PROT UR STRIP-MCNC: 30 MG/DL
RBC # BLD AUTO: 3.28 M/UL (ref 4.1–5.7)
RBC #/AREA URNS HPF: ABNORMAL /HPF (ref 0–5)
SERVICE CMNT-IMP: ABNORMAL
SERVICE CMNT-IMP: NORMAL
SODIUM SERPL-SCNC: 125 MMOL/L (ref 136–145)
SODIUM SERPL-SCNC: 126 MMOL/L (ref 136–145)
SODIUM UR-SCNC: 43 MMOL/L
SP GR UR REFRACTOMETRY: 1.01
URINE CULTURE IF INDICATED: ABNORMAL
UROBILINOGEN UR QL STRIP.AUTO: 0.2 EU/DL (ref 0.2–1)
UUN UR-MCNC: 252 MG/DL
WBC # BLD AUTO: 7.3 K/UL (ref 4.1–11.1)
WBC URNS QL MICRO: ABNORMAL /HPF (ref 0–4)

## 2025-04-13 PROCEDURE — 82570 ASSAY OF URINE CREATININE: CPT

## 2025-04-13 PROCEDURE — 36415 COLL VENOUS BLD VENIPUNCTURE: CPT

## 2025-04-13 PROCEDURE — 6360000002 HC RX W HCPCS: Performed by: STUDENT IN AN ORGANIZED HEALTH CARE EDUCATION/TRAINING PROGRAM

## 2025-04-13 PROCEDURE — 82962 GLUCOSE BLOOD TEST: CPT

## 2025-04-13 PROCEDURE — 2500000003 HC RX 250 WO HCPCS: Performed by: STUDENT IN AN ORGANIZED HEALTH CARE EDUCATION/TRAINING PROGRAM

## 2025-04-13 PROCEDURE — 51798 US URINE CAPACITY MEASURE: CPT

## 2025-04-13 PROCEDURE — 6370000000 HC RX 637 (ALT 250 FOR IP): Performed by: STUDENT IN AN ORGANIZED HEALTH CARE EDUCATION/TRAINING PROGRAM

## 2025-04-13 PROCEDURE — 84300 ASSAY OF URINE SODIUM: CPT

## 2025-04-13 PROCEDURE — 84540 ASSAY OF URINE/UREA-N: CPT

## 2025-04-13 PROCEDURE — 76700 US EXAM ABDOM COMPLETE: CPT

## 2025-04-13 PROCEDURE — 2580000003 HC RX 258: Performed by: STUDENT IN AN ORGANIZED HEALTH CARE EDUCATION/TRAINING PROGRAM

## 2025-04-13 PROCEDURE — 2060000000 HC ICU INTERMEDIATE R&B

## 2025-04-13 PROCEDURE — 6370000000 HC RX 637 (ALT 250 FOR IP): Performed by: INTERNAL MEDICINE

## 2025-04-13 PROCEDURE — 83935 ASSAY OF URINE OSMOLALITY: CPT

## 2025-04-13 PROCEDURE — 80048 BASIC METABOLIC PNL TOTAL CA: CPT

## 2025-04-13 PROCEDURE — 85025 COMPLETE CBC W/AUTO DIFF WBC: CPT

## 2025-04-13 PROCEDURE — 81001 URINALYSIS AUTO W/SCOPE: CPT

## 2025-04-13 RX ORDER — ZOLPIDEM TARTRATE 5 MG/1
5 TABLET ORAL NIGHTLY PRN
Status: DISCONTINUED | OUTPATIENT
Start: 2025-04-13 | End: 2025-04-15 | Stop reason: HOSPADM

## 2025-04-13 RX ORDER — FLECAINIDE ACETATE 50 MG/1
50 TABLET ORAL EVERY 12 HOURS SCHEDULED
Status: DISCONTINUED | OUTPATIENT
Start: 2025-04-13 | End: 2025-04-15 | Stop reason: HOSPADM

## 2025-04-13 RX ORDER — OMEPRAZOLE 20 MG/1
20 CAPSULE, DELAYED RELEASE ORAL DAILY
Status: ON HOLD | COMMUNITY
End: 2025-04-15

## 2025-04-13 RX ORDER — WARFARIN SODIUM 4 MG/1
4 TABLET ORAL
COMMUNITY

## 2025-04-13 RX ORDER — WARFARIN SODIUM 3 MG/1
3 TABLET ORAL
COMMUNITY

## 2025-04-13 RX ORDER — ALLOPURINOL 300 MG/1
300 TABLET ORAL DAILY
COMMUNITY

## 2025-04-13 RX ORDER — PIOGLITAZONE 15 MG/1
15 TABLET ORAL DAILY
Status: ON HOLD | COMMUNITY
End: 2025-04-15

## 2025-04-13 RX ORDER — FLECAINIDE ACETATE 100 MG/1
100 TABLET ORAL 2 TIMES DAILY
Status: ON HOLD | COMMUNITY
End: 2025-04-15 | Stop reason: HOSPADM

## 2025-04-13 RX ORDER — CARVEDILOL 25 MG/1
25 TABLET ORAL 2 TIMES DAILY
COMMUNITY

## 2025-04-13 RX ORDER — ATORVASTATIN CALCIUM 20 MG/1
20 TABLET, FILM COATED ORAL DAILY
COMMUNITY

## 2025-04-13 RX ADMIN — ENOXAPARIN SODIUM 105 MG: 150 INJECTION SUBCUTANEOUS at 08:43

## 2025-04-13 RX ADMIN — ONDANSETRON 4 MG: 2 INJECTION, SOLUTION INTRAMUSCULAR; INTRAVENOUS at 08:23

## 2025-04-13 RX ADMIN — FLECAINIDE ACETATE 50 MG: 50 TABLET ORAL at 20:41

## 2025-04-13 RX ADMIN — PROCHLORPERAZINE EDISYLATE 10 MG: 5 INJECTION INTRAMUSCULAR; INTRAVENOUS at 08:48

## 2025-04-13 RX ADMIN — ACETAMINOPHEN 650 MG: 325 TABLET ORAL at 13:05

## 2025-04-13 RX ADMIN — FLECAINIDE ACETATE 100 MG: 100 TABLET ORAL at 08:33

## 2025-04-13 RX ADMIN — SODIUM CHLORIDE: 0.9 INJECTION, SOLUTION INTRAVENOUS at 05:29

## 2025-04-13 RX ADMIN — ZOLPIDEM TARTRATE 5 MG: 5 TABLET ORAL at 20:41

## 2025-04-13 RX ADMIN — ATORVASTATIN CALCIUM 40 MG: 40 TABLET, FILM COATED ORAL at 20:40

## 2025-04-13 RX ADMIN — CARVEDILOL 25 MG: 12.5 TABLET, FILM COATED ORAL at 18:23

## 2025-04-13 RX ADMIN — ALLOPURINOL 300 MG: 100 TABLET ORAL at 08:33

## 2025-04-13 RX ADMIN — CARVEDILOL 25 MG: 12.5 TABLET, FILM COATED ORAL at 08:33

## 2025-04-13 RX ADMIN — SODIUM CHLORIDE, PRESERVATIVE FREE 10 ML: 5 INJECTION INTRAVENOUS at 20:43

## 2025-04-13 ASSESSMENT — PAIN SCALES - GENERAL: PAINLEVEL_OUTOF10: 3

## 2025-04-13 ASSESSMENT — PAIN DESCRIPTION - LOCATION: LOCATION: BACK;NECK

## 2025-04-13 ASSESSMENT — PAIN DESCRIPTION - DESCRIPTORS: DESCRIPTORS: ACHING

## 2025-04-13 NOTE — PROGRESS NOTES
Hospitalist Progress Note    NAME:   Abner Butler   : 1950   MRN: [id number]     Date/Time: 2025 9:30 AM  Patient PCP: Obi Dos Santos MD    Estimated discharge date:4/15   Barriers: needs cardiology, nephrology , gi . Improvement in na and cr     Assessment / Plan:  EVELIN likely due to dehydration/prerenal  Increased nausea   Has not been able to hydrate enough   Patient admitted to intermediate care with tele   Creatinine on arrival-3.66  Urinalysis done 4/10/2025-mild proteinuria  Urine cr - 99.7  Check bladder scans  Ordered for normal saline maintenance infusion  Trend BMP every 6 hours for now  Avoid nephrotoxic agents renal dose medications  Nephrology consulted      Hypertensive urgency on arrival  OF hypertension on irbesartan and carvedilol at home  Blood pressure 201/93  EKG on arrival normal sinus rhythm heart rate of 60 bpm first-degree heart block  S/p nicardipine   Will continue carvedilol-blood pressure at goal  Irbesartan-formulary substitution losartan currently held because of poor kidney function     Persistent nausea and vomiting  Possible gastroparesis from DM   Had recent ED visit for the same  CT scan of abdomen pelvis done 4/10/2025--no acute abnormality  Patient is planned for endoscopy and colonoscopy on 4/15/2025  Will start Compazine-mentioned Zofran did not help  MAY need GES   Nausea has improved and patient tolerating diet      Acute hyponatremia likely due to hypovolemia  Sodium on arrival 125  Ordered for urine creatinine osmolality serum osmolality urine sodium  Tsh- 0.91  Serum osm - 273, urine osm - 286  Continue normal saline infusion for now  Trend BMP every 6 hours  Follow-up correction 6-8 mg every 24 hours  Nephrology consulted      Known case of diabetes on metformin and pioglitazone at home  Held oral medications  Started insulin  Diabetic diet  Hypoglycemic treatment order in place     Known case of A-fib on flecainide and Warfarin  Sinus pauses    Continue home meds   On lovenox bridge   Cardiology consulted - discussed with Dr Correia - advised for electrolyte correction   May need to decrease/ stop coreg      Known case of gout   Continue allopurinol      Medical Decision Making:   I personally reviewed labs: CBC BMP, serum and urine osm   I personally reviewed imaging:none   I personally reviewed EKG:tele with sinus pauses   Toxic drug monitoring: Monitor for bleeding on enoxaparin, daily CBC, monitor QT while on Compazine  Discussed case with:patient , rn      Code Status: Full code  DVT Prophylaxis: Enoxaparin  Baseline: Independent ambulatory    Subjective:     Chief Complaint / Reason for Physician Visit  Patient admitted for samson , dehydration , nausea . He also had sinus pauses .     Labs and charts reviewed and patient examined . He appeared to be comfortable and in no distress .       Objective:     VITALS:   Last 24hrs VS reviewed since prior progress note. Most recent are:  Patient Vitals for the past 24 hrs:   BP Temp Temp src Pulse Resp SpO2 Height Weight   04/13/25 0833 (!) 168/77 -- -- 69 -- -- -- --   04/13/25 0815 -- 97.6 °F (36.4 °C) Oral -- -- -- -- --   04/13/25 0351 -- -- -- 69 15 93 % -- --   04/13/25 0350 (!) 153/74 97.6 °F (36.4 °C) Oral 67 21 90 % -- --   04/13/25 0102 136/63 -- -- 58 14 95 % -- --   04/13/25 0012 -- -- -- -- -- 93 % -- --   04/13/25 0006 (!) 135/59 97.7 °F (36.5 °C) Oral 63 15 (!) 88 % -- --   04/12/25 2332 (!) 127/52 -- -- 62 14 -- -- --   04/12/25 2300 (!) 123/52 -- -- 63 14 -- -- --   04/12/25 2200 (!) 136/51 -- -- 66 16 -- -- --   04/12/25 2100 (!) 141/57 -- -- 69 17 -- -- --   04/12/25 2000 (!) 169/73 97.8 °F (36.6 °C) Oral 72 20 92 % -- --   04/12/25 1842 (!) 175/83 97.6 °F (36.4 °C) Oral 74 18 -- -- --   04/12/25 1815 (!) 198/95 -- -- 73 18 95 % -- --   04/12/25 1800 (!) 206/88 -- -- 65 15 96 % -- --   04/12/25 1745 (!) 195/81 -- -- 67 16 95 % -- --   04/12/25 1730 (!) 201/93 -- -- 67 12 96 % -- --

## 2025-04-13 NOTE — CONSULTS
Nephrology Consult Note      Assessment:  EVELIN on CKD-3a? Garrett bump in serum Cr to 4mg/dl. I suspect this is largely from LAMONT. IV Contrast exposure in the ER on 4/10/25 PTA + RASi. May have some mild DM nephropathy.    Hyponatremia: Na 125-> 126. multifactorial-> GFR loss/Nausea stimulating ADH release    N/V: acute on chronic issue    HTN: elevated on presentation-> now fair    Afib    Plan/Recommendations:  Hoping to see serum Cr plateau over the next 24-48hrs  Lower IV NS rate to 75cc/hr  Renal ultrasound  Control nausea  Hold RASi  Strict I/Os  Avoid nephrotoxins  Am labs      Discussed with patient and RN    Thanks for the consultation. Renal service will follow patient with you. Please contact me with any questions or concerns.    Initial Consult note         Patient name: Abner Butler  MR no: [id number]  Date of admission: 4/12/2025  Date of consultation: 4/13/25  Requested by: Dr. Jaxson Bowie  Reason for consult: EVELIN    Patient seen and examined. History obtained from patient and chart review. Relevant labs, data and notes reviewed.      HPI: Abner Butler is a 74 y.o. male with PMH significant for HTN, DM2, Gout, Afib who presented to the ER with N/V. ED lab work notable for bump in serum Cr to 3.6mg/dl and Na 125.Repeat serum Cr up to 3.8mg/dl and Na 126 today. Nephrology consulted to evaluate and manage EVELIN. Patient was in the ER on 4/10/25 with similar sx-> Cr was 1.2mg/dl. Underwent CT Abd with IV contrast which was unremarkable. Patient reports intermittent n/v for the past year or so but last several days he has not been able to eat solids. Tried to push water intake. Noted drop off in UOP.. Denies any NSAID use.    PMH:  No past medical history on file.   PSH:  No past surgical history on file.     Social history:   Social History       Tobacco History       Smoking Status  Former Current Packs/Day  1 pack/day Average Packs/Day  1 pack/day for 20.0 years (20.0 ttl pk-yrs) Smoking  Tobacco Type  Cigarettes   Pack Year History     Packs/Day From To Years    1   20.0      Passive Exposure  Never      Smokeless Tobacco Use  Never              Alcohol History       Alcohol Use Status  Yes Drinks/Week  1 Cans of beer per week Amount  1.0 standard drink of alcohol/wk Comment  one beer every once in a while              Drug Use       Drug Use Status  Never              Sexual Activity       Sexually Active  Not Asked                     Family history:  No history of CKD or ESRD in the family.     No Known Allergies    Current Facility-Administered Medications   Medication Dose Route Frequency Provider Last Rate Last Admin    flecainide (TAMBOCOR) tablet 50 mg  50 mg Oral 2 times per day Lucas Correia MD        sodium chloride flush 0.9 % injection 5-40 mL  5-40 mL IntraVENous 2 times per day Jaxson Bowie MD   10 mL at 04/12/25 2036    sodium chloride flush 0.9 % injection 5-40 mL  5-40 mL IntraVENous PRN Jaxson Bowie MD        0.9 % sodium chloride infusion   IntraVENous PRN Jaxson Bowie MD        ondansetron (ZOFRAN-ODT) disintegrating tablet 4 mg  4 mg Oral Q8H PRN Jaxson Bowie MD        Or    ondansetron (ZOFRAN) injection 4 mg  4 mg IntraVENous Q6H PRN Jaxson Bowie MD   4 mg at 04/13/25 0823    polyethylene glycol (GLYCOLAX) packet 17 g  17 g Oral Daily PRN Jaxson Bowie MD        acetaminophen (TYLENOL) tablet 650 mg  650 mg Oral Q6H PRN Jaxson Bowie MD   650 mg at 04/13/25 1305    Or    acetaminophen (TYLENOL) suppository 650 mg  650 mg Rectal Q6H PRN Jaxson Bowie MD        0.9 % sodium chloride infusion   IntraVENous Continuous Jaxson Bowie  mL/hr at 04/13/25 0529 New Bag at 04/13/25 0529    prochlorperazine (COMPAZINE) injection 10 mg  10 mg IntraVENous Q6H PRN Jaxson Bowie MD   10 mg at 04/13/25 0848    [Held by provider] losartan (COZAAR) tablet 100 mg  100 mg Oral Daily BajraJaxson lunsford MD carvedilol

## 2025-04-13 NOTE — PROGRESS NOTES
RN  at bedside and noticed drop in heart rate. Made call to telemetry and was informed that pt had 6 seconds where heart rate dropped into the 30s. Also had a 2 second pause. Tele states they will upload strip to chart. Attending MD notified via secure message and cardiology consult ordered.

## 2025-04-13 NOTE — CONSULTS
[facility]        Consult    NAME: Abner Butler   :  1950   MRN:  [id number]     Date/Time:  2025 4:27 PM    Patient PCP: Obi Dos Santos MD  ________________________________________________________________________     Assessment:     Problem list:  1. Recurrent atrial fibrillation, persistent and symptomatic.  Didn't tolerate oral amiodarone.  Multaq had early failure.  Now on warfarin, stopped Xarelto due to cost.  He had a PVI for Afib and also RA flutter line 3/2016.  HOLDING SINUS RHYTHM ON MEDICAL THERAPY NOW.  2. Conduction disorder, other (prolonged QT after ibutilide on 10/16).  3. Sinus bradycardia and first degree AV block on high dose beta-blocker, asymptomatic to date.  4.  PVC's.  5. Chronic RBBB.  6. Hypertension, essential.  7. Hypercholesterolemia.  8. Diabetes type 2 without mention of complications.  9. H/o GI (diverticular) bleed in remote past.  H&H normal on prior check.    2024 EP update:  Denies syncope, dizziness, palpitations.  No chest, jaw, neck, shoulder, or arm pain.  No orthopnea, PND, or edema. Patient denies claudication. There is no discoloration or ulceration of the lower extremities. The patient has had no TIA or stroke-like symptoms. No significant change since last visit.  No hospitalizations or surgeries recently but anticipates a screening colonoscopy.    2025  Persistent nausea unclear  EVELIN  Sinus pause on tele    Cardiologist:  Gustavo        Plan:     - No chest pain  - Dry  - Sinus with blocked PAC, has first degree AV block, RBBB    Update echo    - Holding warfarin for GI procedures, currently in sinus  - Decrease flecainide from 100mg bid to 50mg bid, ?contributing to nausea  - Cont carvedilol  - Holding irbesartan until renal function improves  - Cont hydration, monitor bmp  - Cont atorvastatin         [x]       High complexity decision making was performed in this patient at high risk for decompensation with  rhythm with 1st degree AV block  Right bundle branch block  When compared with ECG of 21-OCT-2015 02:33,  Sinus rhythm has replaced Atrial fibrillation  QRS duration has increased  Confirmed by Hussein Barrientos DO ([id number]) on 4/12/2025 4:08:21 PM        No results found for this or any previous visit.    Prior to Admission medications    Medication Sig Start Date End Date Taking? Authorizing Provider   carvedilol (COREG) 25 MG tablet Take 1 tablet by mouth 2 times daily   Yes Provider, MD Tono   flecainide (TAMBOCOR) 100 MG tablet Take 1 tablet by mouth 2 times daily   Yes Provider, Historical, MD   metFORMIN (GLUCOPHAGE) 500 MG tablet Take 1 tablet by mouth 2 times daily (with meals)   Yes Provider, Historical, MD   omeprazole (PRILOSEC) 20 MG delayed release capsule Take 1 capsule by mouth daily   Yes Provider, Historical, MD   pioglitazone (ACTOS) 15 MG tablet Take 1 tablet by mouth daily   Yes Provider, Historical, MD   atorvastatin (LIPITOR) 20 MG tablet Take 1 tablet by mouth daily   Yes Provider, Historical, MD   allopurinol (ZYLOPRIM) 300 MG tablet Take 1 tablet by mouth daily   Yes Provider, Historical, MD   warfarin (COUMADIN) 3 MG tablet Take 1 tablet by mouth 5 times a week   Yes Provider, Historical, MD   warfarin (COUMADIN) 4 MG tablet Take 1 tablet by mouth Two times a week   Yes Provider, Historical, MD   ondansetron (ZOFRAN-ODT) 4 MG disintegrating tablet Take 1 tablet by mouth 3 times daily as needed for Nausea or Vomiting 4/10/25   Sohail Castillo MD       Return Office Visit  12/02/2024 10:00 AM    Patient: Abner Butler   YOB: 1950   Account #:  [id number]  PRIMARY CARE PHYSICIAN:  NARESH oDs Santos   CARDIOLOGIST:  Steven Chen    REASON FOR VISIT:  This 74 year old patient presents for AFib and RBBB.    HISTORY OF PRESENT ILLNESS:   Problem list:  1. Recurrent atrial fibrillation, persistent and symptomatic.  Didn't tolerate oral amiodarone.  Multaq had early failure.

## 2025-04-13 NOTE — CONSULTS
Initial GI Consult Note ([facility])    NAME:Abner Butler :1950 MRN:[id number]   ATTG: [unfilled]  PCP: Obi Dos Santos MD  Date/Time:  2025 4:26 PM     Assessment:     73 yo M w/ DMII, HTN ,Afib, on warfarin & flecanide, presenting for further eval & mgmt of progressive worsening of what is now chronic nausea, in ED found to have EVELIN, hyponatremic- CT from 2x days prior to this admit was unremarkable, GI consulted for further eval & mgmt.   Given the nature of the problem, I am more concerned about the gallbladder being the  behind his symptoms than I am about a foregut or intestinal process     Plan:     Continue to hydrate and normalize electrolyte disturbance.  RUQ abd US on Monday(most gallstones are not calcified)   EGD is still reasonable (is Na is closer to normal)- maybe even as it is currently scheduled, but I don't believe taking a full bowel prep is worth putting him through right now  If Abd US and EGD are unremarkable, let him eat a little something and move forward with HIDA scan w/ CCK on Wed/ Thursday  These tests can be done despite his acute kidney injury     Subjective:     73 yo M w/ DMII, HTN ,Afib, on warfarin & flecanide, presenting for further eval & mgmt of progressive worsening of what is now a chronic problem w/ recurrent nausea.  - lasts several days at a time  - dad had colon cancer, been getting q5 year colonoscopy for years  - when the discomfort abates, it goes away completely      Review of Systems:  Symptom Y/N Comments  Symptom Y/N Comments   Fever/Chills N   Chest Pain N    Cough N   Headaches N    Sputum N   Joint Pain N    SOB/HAYWOOD N   Pruritis/Rash N    Tolerating Diet N   Other       Could NOT obtain due to:      Objective:     VITALS:   Last 24hrs VS reviewed since prior progress note. Most recent are:    Vitals:    25 1230   BP: (!) 154/74   Pulse: 68   Resp: 24   Temp:    SpO2:          Intake/Output Summary (Last 24 hours) at 2025

## 2025-04-14 ENCOUNTER — ANESTHESIA EVENT (OUTPATIENT)
Facility: HOSPITAL | Age: 75
DRG: 683 | End: 2025-04-14
Payer: MEDICARE

## 2025-04-14 ENCOUNTER — APPOINTMENT (OUTPATIENT)
Facility: HOSPITAL | Age: 75
DRG: 683 | End: 2025-04-14
Attending: INTERNAL MEDICINE
Payer: MEDICARE

## 2025-04-14 LAB
ALBUMIN SERPL-MCNC: 3.5 G/DL (ref 3.5–5)
ALBUMIN/GLOB SERPL: 1.1 (ref 1.1–2.2)
ALP SERPL-CCNC: 74 U/L (ref 45–117)
ALT SERPL-CCNC: 20 U/L (ref 12–78)
ANION GAP SERPL CALC-SCNC: 6 MMOL/L (ref 2–12)
AST SERPL-CCNC: 22 U/L (ref 15–37)
BASOPHILS # BLD: 0.02 K/UL (ref 0–0.1)
BASOPHILS NFR BLD: 0.3 % (ref 0–1)
BILIRUB SERPL-MCNC: 1 MG/DL (ref 0.2–1)
BUN SERPL-MCNC: 27 MG/DL (ref 6–20)
BUN/CREAT SERPL: 9 (ref 12–20)
CALCIUM SERPL-MCNC: 8.7 MG/DL (ref 8.5–10.1)
CHLORIDE SERPL-SCNC: 99 MMOL/L (ref 97–108)
CO2 SERPL-SCNC: 25 MMOL/L (ref 21–32)
CREAT SERPL-MCNC: 3.1 MG/DL (ref 0.7–1.3)
DIFFERENTIAL METHOD BLD: ABNORMAL
EOSINOPHIL # BLD: 0.08 K/UL (ref 0–0.4)
EOSINOPHIL NFR BLD: 1.4 % (ref 0–7)
ERYTHROCYTE [DISTWIDTH] IN BLOOD BY AUTOMATED COUNT: 13.5 % (ref 11.5–14.5)
GLOBULIN SER CALC-MCNC: 3.3 G/DL (ref 2–4)
GLUCOSE BLD STRIP.AUTO-MCNC: 100 MG/DL (ref 65–117)
GLUCOSE BLD STRIP.AUTO-MCNC: 124 MG/DL (ref 65–117)
GLUCOSE BLD STRIP.AUTO-MCNC: 138 MG/DL (ref 65–117)
GLUCOSE SERPL-MCNC: 86 MG/DL (ref 65–100)
HCT VFR BLD AUTO: 29.5 % (ref 36.6–50.3)
HGB BLD-MCNC: 9.9 G/DL (ref 12.1–17)
IMM GRANULOCYTES # BLD AUTO: 0.02 K/UL (ref 0–0.04)
IMM GRANULOCYTES NFR BLD AUTO: 0.3 % (ref 0–0.5)
LYMPHOCYTES # BLD: 0.91 K/UL (ref 0.8–3.5)
LYMPHOCYTES NFR BLD: 15.4 % (ref 12–49)
MAGNESIUM SERPL-MCNC: 1.7 MG/DL (ref 1.6–2.4)
MCH RBC QN AUTO: 30.4 PG (ref 26–34)
MCHC RBC AUTO-ENTMCNC: 33.6 G/DL (ref 30–36.5)
MCV RBC AUTO: 90.5 FL (ref 80–99)
MONOCYTES # BLD: 0.57 K/UL (ref 0–1)
MONOCYTES NFR BLD: 9.6 % (ref 5–13)
NEUTS SEG # BLD: 4.32 K/UL (ref 1.8–8)
NEUTS SEG NFR BLD: 73 % (ref 32–75)
NRBC # BLD: 0 K/UL (ref 0–0.01)
NRBC BLD-RTO: 0 PER 100 WBC
PHOSPHATE SERPL-MCNC: 3.4 MG/DL (ref 2.6–4.7)
PLATELET # BLD AUTO: 142 K/UL (ref 150–400)
PMV BLD AUTO: 10.4 FL (ref 8.9–12.9)
POTASSIUM SERPL-SCNC: 4 MMOL/L (ref 3.5–5.1)
PROT SERPL-MCNC: 6.8 G/DL (ref 6.4–8.2)
RBC # BLD AUTO: 3.26 M/UL (ref 4.1–5.7)
SERVICE CMNT-IMP: ABNORMAL
SERVICE CMNT-IMP: ABNORMAL
SERVICE CMNT-IMP: NORMAL
SODIUM SERPL-SCNC: 130 MMOL/L (ref 136–145)
WBC # BLD AUTO: 5.9 K/UL (ref 4.1–11.1)

## 2025-04-14 PROCEDURE — 83735 ASSAY OF MAGNESIUM: CPT

## 2025-04-14 PROCEDURE — 6370000000 HC RX 637 (ALT 250 FOR IP): Performed by: STUDENT IN AN ORGANIZED HEALTH CARE EDUCATION/TRAINING PROGRAM

## 2025-04-14 PROCEDURE — 80053 COMPREHEN METABOLIC PANEL: CPT

## 2025-04-14 PROCEDURE — 2580000003 HC RX 258: Performed by: INTERNAL MEDICINE

## 2025-04-14 PROCEDURE — 2060000000 HC ICU INTERMEDIATE R&B

## 2025-04-14 PROCEDURE — 6360000002 HC RX W HCPCS: Performed by: STUDENT IN AN ORGANIZED HEALTH CARE EDUCATION/TRAINING PROGRAM

## 2025-04-14 PROCEDURE — 2700000000 HC OXYGEN THERAPY PER DAY

## 2025-04-14 PROCEDURE — 36415 COLL VENOUS BLD VENIPUNCTURE: CPT

## 2025-04-14 PROCEDURE — 85025 COMPLETE CBC W/AUTO DIFF WBC: CPT

## 2025-04-14 PROCEDURE — 84100 ASSAY OF PHOSPHORUS: CPT

## 2025-04-14 PROCEDURE — 6370000000 HC RX 637 (ALT 250 FOR IP): Performed by: PHYSICIAN ASSISTANT

## 2025-04-14 PROCEDURE — 6370000000 HC RX 637 (ALT 250 FOR IP): Performed by: INTERNAL MEDICINE

## 2025-04-14 PROCEDURE — 2500000003 HC RX 250 WO HCPCS: Performed by: STUDENT IN AN ORGANIZED HEALTH CARE EDUCATION/TRAINING PROGRAM

## 2025-04-14 PROCEDURE — 82962 GLUCOSE BLOOD TEST: CPT

## 2025-04-14 RX ORDER — PANTOPRAZOLE SODIUM 40 MG/1
40 TABLET, DELAYED RELEASE ORAL
Status: DISCONTINUED | OUTPATIENT
Start: 2025-04-14 | End: 2025-04-15 | Stop reason: HOSPADM

## 2025-04-14 RX ADMIN — ALLOPURINOL 300 MG: 100 TABLET ORAL at 08:08

## 2025-04-14 RX ADMIN — CARVEDILOL 25 MG: 12.5 TABLET, FILM COATED ORAL at 16:06

## 2025-04-14 RX ADMIN — ATORVASTATIN CALCIUM 40 MG: 40 TABLET, FILM COATED ORAL at 21:12

## 2025-04-14 RX ADMIN — SODIUM CHLORIDE: 0.9 INJECTION, SOLUTION INTRAVENOUS at 07:46

## 2025-04-14 RX ADMIN — SODIUM CHLORIDE, PRESERVATIVE FREE 10 ML: 5 INJECTION INTRAVENOUS at 08:08

## 2025-04-14 RX ADMIN — PANTOPRAZOLE SODIUM 40 MG: 40 TABLET, DELAYED RELEASE ORAL at 16:06

## 2025-04-14 RX ADMIN — ZOLPIDEM TARTRATE 5 MG: 5 TABLET ORAL at 21:12

## 2025-04-14 RX ADMIN — ENOXAPARIN SODIUM 105 MG: 150 INJECTION SUBCUTANEOUS at 08:09

## 2025-04-14 RX ADMIN — SODIUM CHLORIDE, PRESERVATIVE FREE 10 ML: 5 INJECTION INTRAVENOUS at 21:13

## 2025-04-14 RX ADMIN — FLECAINIDE ACETATE 50 MG: 50 TABLET ORAL at 08:08

## 2025-04-14 RX ADMIN — CARVEDILOL 25 MG: 12.5 TABLET, FILM COATED ORAL at 08:08

## 2025-04-14 RX ADMIN — FLECAINIDE ACETATE 50 MG: 50 TABLET ORAL at 21:12

## 2025-04-14 NOTE — PROGRESS NOTES
Bed exit alarming, pt sitting on side of bed. Education provided on use of alarms to help prevent falls and possible injury while in hospital. Pt verbalizes understanding, states he will call for assistance before getting OOB, and declines alarms; pt elects to sign bed alarm refusal form.     Pt also asked to have NC removed- on 2L 97%. Decreased O2 to 1L to attempt to wean O2, and advised I will notify his primary RN.

## 2025-04-14 NOTE — PROGRESS NOTES
Nephrology Consult Note      Assessment:  EVELIN on CKD-3a  -creatinine better from 4.09 yesterday to 3.1 today.  Urine output also picked up    Hyponatremia: Na 125-> 126.=> 130-better multifactorial-> GFR loss/Nausea stimulating ADH release    N/V: acute on chronic issue    HTN: elevated on presentation-> now fair    Afib    Plan/Recommendations:   renal function has improved some  Continue with IV fluids  Will stop it during the night.  Patient is apprehensive about dehydration that could be caused by colonoscopy prep.  I appreciate GI postponing it    Romario Kay MD  [facility] ([facility])     established patient visit note    Subjective;-no new complaints today  Feeling better  Eating better    Voiding better  Out of bed in chair    PMH:  No past medical history on file.   PSH:  No past surgical history on file.     Social history:   Social History       Tobacco History       Smoking Status  Former Current Packs/Day  1 pack/day Average Packs/Day  1 pack/day for 20.0 years (20.0 ttl pk-yrs) Smoking Tobacco Type  Cigarettes   Pack Year History     Packs/Day From To Years    1   20.0      Passive Exposure  Never      Smokeless Tobacco Use  Never              Alcohol History       Alcohol Use Status  Yes Drinks/Week  1 Cans of beer per week Amount  1.0 standard drink of alcohol/wk Comment  one beer every once in a while              Drug Use       Drug Use Status  Never              Sexual Activity       Sexually Active  Not Asked                     Family history:  No history of CKD or ESRD in the family.     No Known Allergies    Current Facility-Administered Medications   Medication Dose Route Frequency Provider Last Rate Last Admin    Warfarin - Hold 4/14   Other Once Jaxsno Bowie MD        pantoprazole (PROTONIX) tablet 40 mg  40 mg Oral BID Batool Fatima PA   40 mg at 04/14/25 1606    flecainide (TAMBOCOR) tablet 50 mg  50 mg Oral 2 times per day Lucas Correia MD

## 2025-04-14 NOTE — PROGRESS NOTES
End of Shift Note    Bedside shift change report given to Ayaan by Karime Tolentino RN. Report included the following information SBAR    Shift worked:    1900 pm to 0700 am    Shift summary and any significant changes:       No significant change   Concerns for physician to address:       Zone phone for oncoming shift:          Activity:  Level of Assistance: Minimal assist, patient does 75% or more  Number times ambulated in hallways past shift: 1  Number of times OOB to chair past shift: 1    Cardiac:   Cardiac Monitoring: Yes      Cardiac Rhythm: Sinus rhythm    Access:  Current line(s): PIV    Genitourinary:   Urinary Status: Voiding, Bathroom privileges    Respiratory:   O2 Device: Nasal cannula  Chronic home O2 use?: YES  Incentive spirometer at bedside: NO    GI:  Last BM (including prior to admit): 04/11/25  Current diet:  ADULT DIET; Regular  Passing flatus: YES    Pain Management:   Patient states pain is manageable on current regimen: YES    Skin:  Pravin Scale Score: 19  Interventions: Wound Offloading (Prevention Methods): Bed, pressure reduction mattress, Pillows, Repositioning    Patient Safety:  Fall Risk: Nursing Judgement-Fall Risk High(Add Comments): Yes  Fall Risk Interventions  Nursing Judgement-Fall Risk High(Add Comments): Yes  Toilet Every 2 Hours-In Advance of Need: Yes  Hourly Visual Checks: Awake, In bed, Quiet  Fall Visual Posted: Armband, Fall sign posted, Socks  Room Door Open: No (Comment)  Alarm On:  (patient refuse bed alarm. Patient sign refusal form and refusal form is also place in patient chart. The charge nurse Alethea is aware of it.)  Patient Moved Closer to Nursing Station: No    Active Consults:   IP CONSULT TO PHARMACY  IP CONSULT TO GI  IP CONSULT TO NEPHROLOGY  IP CONSULT TO CARDIOLOGY    Length of Stay:  Expected LOS: 4  Actual LOS: 1    Karime Tolentino RN

## 2025-04-14 NOTE — ANESTHESIA PRE PROCEDURE
Department of Anesthesiology  Preprocedure Note       Name:  Abner Butler   Age:  74 y.o.  :  1950                                          MRN:  [id number]         Date:  2025      Surgeon: Surgeon(s):  Naun Negrete DO    Procedure: Procedure(s):  ESOPHAGOGASTRODUODENOSCOPY, COLONOSCOPY DIAGNOSTIC    Medications prior to admission:   Prior to Admission medications    Medication Sig Start Date End Date Taking? Authorizing Provider   carvedilol (COREG) 25 MG tablet Take 1 tablet by mouth 2 times daily   Yes Provider, Historical, MD   flecainide (TAMBOCOR) 100 MG tablet Take 1 tablet by mouth 2 times daily   Yes Provider, Historical, MD   metFORMIN (GLUCOPHAGE) 500 MG tablet Take 1 tablet by mouth 2 times daily (with meals)   Yes Provider, Historical, MD   omeprazole (PRILOSEC) 20 MG delayed release capsule Take 1 capsule by mouth daily   Yes Provider, Historical, MD   pioglitazone (ACTOS) 15 MG tablet Take 1 tablet by mouth daily   Yes Provider, Historical, MD   atorvastatin (LIPITOR) 20 MG tablet Take 1 tablet by mouth daily   Yes Provider, Historical, MD   allopurinol (ZYLOPRIM) 300 MG tablet Take 1 tablet by mouth daily   Yes Provider, Historical, MD   warfarin (COUMADIN) 3 MG tablet Take 1 tablet by mouth 5 times a week   Yes Provider, Historical, MD   warfarin (COUMADIN) 4 MG tablet Take 1 tablet by mouth Two times a week   Yes Provider, Historical, MD   ondansetron (ZOFRAN-ODT) 4 MG disintegrating tablet Take 1 tablet by mouth 3 times daily as needed for Nausea or Vomiting 4/10/25   Sohail Castillo MD       Current medications:    Current Facility-Administered Medications   Medication Dose Route Frequency Provider Last Rate Last Admin   • Warfarin - Hold    Other Once Jaxson Bowie MD       • pantoprazole (PROTONIX) tablet 40 mg  40 mg Oral BID Batool Fatima PA   40 mg at 25 1606   • flecainide (TAMBOCOR) tablet 50 mg  50 mg Oral 2 times per day Lucas Correia,

## 2025-04-14 NOTE — PROGRESS NOTES
Hospitalist Progress Note    NAME:   Abner Butler   : 1950   MRN: [id number]     Date/Time: 2025 8:48 AM  Patient PCP: Obi Dos Santos MD    Estimated discharge date:4/15   Barriers:  nephrology , gi . Improvement in na and cr .  Possible endoscopy tomorrow    Assessment / Plan:  EVELIN likely due to dehydration/prerenal  Increased nausea   Has not been able to hydrate enough   Possible LAMONT   Patient admitted to intermediate care with tele   Creatinine on arrival-3.66>>4.01>>3.10  Urinalysis done 4/10/2025-mild proteinuria  Urine cr - 99.7  Ultrasound abdomen done on 2025-no hydronephrosis kidneys normal echogenicity and size  Ordered for normal saline maintenance infusion  Trend BMP every 6 hours for now  Avoid nephrotoxic agents renal dose medications  Nephrology consulted-advised to continue to monitor.     Hypertensive urgency on arrival  Known case OF hypertension on irbesartan and carvedilol at home  Blood pressure 201/93  EKG on arrival normal sinus rhythm heart rate of 60 bpm first-degree heart block  S/p nicardipine   Will continue carvedilol-blood pressure at goal  Irbesartan-formulary substitution losartan currently held because of poor kidney function     Persistent nausea and vomiting  Possible gastroparesis from DM   Had recent ED visit for the same  CT scan of abdomen pelvis done 4/10/2025--no acute abnormality  Patient is planned for endoscopy and colonoscopy on 4/15/2025  Will start Compazine-mentioned Zofran did not help  Nausea has improved and patient tolerating diet   GI followed up advised for ultrasound liver and gallbladder-no active pathology.   GI plan for endoscopy tomorrow in the morning n.p.o. past midnight    Acute hyponatremia likely due to hypovolemia  POSSIBLE Siadh FROM nausea   Sodium on arrival 125>>130  Ordered for urine creatinine osmolality serum osmolality urine sodium  Tsh- 0.91  Serum osm - 273, urine osm - 286  Continue normal saline infusion for  (!) 154/74 -- -- 68 24 --   04/13/25 1200 (!) 143/70 -- -- 63 13 --   04/13/25 1100 (!) 142/70 -- -- 61 17 94 %   04/13/25 1055 132/62 97.2 °F (36.2 °C) Axillary 59 17 93 %   04/13/25 0930 (!) 121/53 -- -- -- -- --         Intake/Output Summary (Last 24 hours) at 4/14/2025 0848  Last data filed at 4/14/2025 0611  Gross per 24 hour   Intake --   Output 3450 ml   Net -3450 ml        I had a face to face encounter and independently examined this patient on 4/14/2025, as outlined below:  PHYSICAL EXAM:  General:          Alert, cooperative, appears stated age.     Lungs:             CTA b/l.  No wheezing or Rhonchi. No rales.  Chest wall:      No tenderness.  No accessory muscle use.  Heart:              Regular  rhythm,  No  Murmur.   No edema  Abdomen:        Soft, NT. ND  BS+  Extremities:     No cyanosis.  No clubbing,     Skin turgor normal, Radial dial pulse 2+. Capillary refill normal  Neurologic:      No facial asymmetry. No aphasia or slurred speech. Symmetrical strength, Sensation grossly intact. AAOx4.           Reviewed most current lab test results and cultures  YES  Reviewed most current radiology test results   YES  Review and summation of old records today    NO  Reviewed patient's current orders and MAR    YES  PMH/SH reviewed - no change compared to H&P    Procedures: see electronic medical records for all procedures/Xrays and details which were not copied into this note but were reviewed prior to creation of Plan.      LABS:  I reviewed today's most current labs and imaging studies.  Pertinent labs include:  Recent Labs     04/12/25  1551 04/13/25  0359 04/14/25  0641   WBC 7.7 7.3 5.9   HGB 11.1* 9.9* 9.9*   HCT 33.8* 30.0* 29.5*    143* 142*     Recent Labs     04/12/25  1551 04/12/25  2056 04/12/25  2131 04/13/25  0359 04/13/25  1100 04/14/25  0641   *   < >  --  125* 126* 130*   K 4.2   < >  --  3.9 4.4 4.0   CL 89*   < >  --  94* 94* 99   CO2 28   < >  --  23 24 25   GLUCOSE 113*   <

## 2025-04-14 NOTE — PROGRESS NOTES
Comprehensive Nutrition Assessment    Type and Reason for Visit:  Initial, Positive nutrition screen    Nutrition Recommendations/Plan:   5 carb choice/2g Na diet     Malnutrition Assessment:  Malnutrition Status:  At risk for malnutrition (04/14/25 1209)    Context:  Acute Illness     Findings of the 6 clinical characteristics of malnutrition:  Energy Intake:  50% or less of estimated energy requirements for 5 or more days  Weight Loss:  Mild weight loss     Body Fat Loss:  No body fat loss     Muscle Mass Loss:  No muscle mass loss    Fluid Accumulation:  No fluid accumulation     Strength:  Not Performed    Nutrition Assessment:    Patient medically noted for EVELIN, nausea, dehydration, and HTN. PMH Hiatal hernia, AFIB, and DM. MST referral received. Patient reports significant nausea over the last week and inability to tolerate anything PO except water, gatorade, and crackers with medications. UBW ~235# and patient suspected a 10# weight loss but current weight 231#. States his nausea is 95% improved this morning and able to eat all of his breakfast. Menu provided to help with meal selections. Plans for EGD tomorrow. Patient's diet order cancelled after RD visit even though he doesn't have to be NPO until tomorrow per notes. Resume diet for today if appropriate; NPO after midnight as ordered.     Nutrition Related Findings:    Na 130, BG -272-120   BM 4/11   Atorvastatin, Coreg, Humalog   Wound Type: None       Current Nutrition Intake & Therapies:    Average Meal Intake: %     Diet NPO    Anthropometric Measures:  Height: 182.9 cm (6')  Ideal Body Weight (IBW): 178 lbs (81 kg)       Current Body Weight: 105 kg (231 lb 7.7 oz),   IBW.    Current BMI (kg/m2): 31.4  Usual Body Weight: 106.6 kg (235 lb)     % Weight Change (Calculated): -1.5                    BMI Categories: Obese Class 1 (BMI 30.0-34.9)    Estimated Daily Nutrient Needs:  Energy Requirements Based On: Formula  Weight Used for Energy

## 2025-04-14 NOTE — PROGRESS NOTES
I reviewed the patient's medical history, the findings on physical examination, the patient's diagnoses, and treatment plan as documented in the note.  I concur with the treatment plan as documented.      LIZBETH Negrete D.O.  [facility], [facility]      GI PROGRESS NOTE  Batool Kelly PA-C  [phone] EVER in-hospital cell phone M-F until 4:30  After 5pm or on weekends, please call  for physician on call    NAME:Abner Butler :1950 MRN:[id number]   ATTG: Dr. Bowie  PCP: Obi Dos Santos MD  Date/Time:  2025 10:29 AM     Assessment:   Nausea  Melena  AFIB on warfarin  Hyponatremia- improving  EVELIN  Anemia  Hgb 9.9, POA 11.4   MCV 90.5  Na 130  Lfts wnl  Cre 3.1, baseline 1.3  ABD ultrasound 2025 wnl  CT A/P 4/10/2025  No acute abnormality.     Plan:   Plan for EGD tomorrow, no colonoscopy at this time as not urgently indicated. Unclear cause, could be from hyponatremia, may need a gastric emptying scan as outpatient.   PO PPI BID for complaint of melena two weeks ago  Coumadin on hold, last INR 1.2 on   NPO midnight  We will continue to follow.     Plan discussed with Dr. Negrete  Subjective:   Patient seen and examined, walking around in room. He admits to persistent nausea but it is improved some today. He was scheduled for outpatient EGD/colonoscopy tomorrow with Dr. Forbes.     REVIEW OF SYSTEMS:    Constitutional: negative fever, negative chills, negative weight loss  Eyes:               negative visual changes  ENT:                negative sore throat, tongue or lip swelling   Respiratory:   negative cough, negative dyspnea  Cards:             negative for chest pain, palpitations, lower extremity edema  GI:                   See HPI  :                  negative for frequency, dysuria  Integument:   negative for rash and pruritus  Heme:             negative for easy bruising and gum/nose bleeding  Musculoskel: negative for myalgias,  back pain and

## 2025-04-14 NOTE — CARE COORDINATION
Care Management Initial Assessment       RUR: 16% Moderate RUR  Readmission? No  1st IM letter given? Yes - 4/12  1st  letter given: No     04/14/25 0818   Service Assessment   Patient Orientation Alert and Oriented;Person;Place;Situation;Self   Cognition Alert   History Provided By Patient   Primary Caregiver Self   Accompanied By/Relationship no one   Support Systems Spouse/Significant Other  (Kathy Butler (Spouse)  [phone])   Patient's Healthcare Decision Maker is: Named in Scanned ACP Document   PCP Verified by CM Yes   Last Visit to PCP Within last 3 months   Prior Functional Level Independent in ADLs/IADLs   Current Functional Level Independent in ADLs/IADLs   Can patient return to prior living arrangement Yes   Ability to make needs known: Good   Family able to assist with home care needs: Yes   Would you like for me to discuss the discharge plan with any other family members/significant others, and if so, who? Yes  (Kathy Butler (Spouse)  [phone])   Financial Resources Medicare   Community Resources None   CM/SW Referral Disease Management Education   Social/Functional History   Lives With Spouse   Type of Home House   Home Layout Two level;Performs ADL's on one level;Able to Live on Main level with bedroom/bathroom   Home Access Level entry   Home Equipment None   Active  Yes   Discharge Planning   Type of Residence Independent Living   Living Arrangements Spouse/Significant Other   Current Services Prior To Admission None   Potential Assistance Needed N/A   Patient expects to be discharged to: Independent living facility         The  (CM) conducted an initial meeting with the patient , formally introducing themselves and clarifying their role in discharge planning and transitional care. Demographic and insurance details were verified for accuracy. Notably, the patient has no prior history with home health, skilled nursing facilities (SNF), or inpatient rehabilitation (IPR).

## 2025-04-15 ENCOUNTER — ANESTHESIA (OUTPATIENT)
Facility: HOSPITAL | Age: 75
DRG: 683 | End: 2025-04-15
Payer: MEDICARE

## 2025-04-15 VITALS
SYSTOLIC BLOOD PRESSURE: 154 MMHG | RESPIRATION RATE: 18 BRPM | OXYGEN SATURATION: 94 % | TEMPERATURE: 97.5 F | BODY MASS INDEX: 31.35 KG/M2 | HEIGHT: 72 IN | DIASTOLIC BLOOD PRESSURE: 73 MMHG | WEIGHT: 231.48 LBS | HEART RATE: 63 BPM

## 2025-04-15 PROBLEM — E11.65 TYPE 2 DIABETES MELLITUS WITH HYPERGLYCEMIA, WITHOUT LONG-TERM CURRENT USE OF INSULIN (HCC): Status: ACTIVE | Noted: 2025-04-15

## 2025-04-15 LAB
ALBUMIN SERPL-MCNC: 3.5 G/DL (ref 3.5–5)
ALBUMIN/GLOB SERPL: 1 (ref 1.1–2.2)
ALP SERPL-CCNC: 72 U/L (ref 45–117)
ALT SERPL-CCNC: 24 U/L (ref 12–78)
ANION GAP SERPL CALC-SCNC: 6 MMOL/L (ref 2–12)
AST SERPL-CCNC: 27 U/L (ref 15–37)
BASOPHILS # BLD: 0.03 K/UL (ref 0–0.1)
BASOPHILS NFR BLD: 0.5 % (ref 0–1)
BILIRUB SERPL-MCNC: 0.8 MG/DL (ref 0.2–1)
BUN SERPL-MCNC: 23 MG/DL (ref 6–20)
BUN/CREAT SERPL: 10 (ref 12–20)
CALCIUM SERPL-MCNC: 8.9 MG/DL (ref 8.5–10.1)
CHLORIDE SERPL-SCNC: 101 MMOL/L (ref 97–108)
CO2 SERPL-SCNC: 25 MMOL/L (ref 21–32)
CREAT SERPL-MCNC: 2.3 MG/DL (ref 0.7–1.3)
DIFFERENTIAL METHOD BLD: ABNORMAL
EOSINOPHIL # BLD: 0.08 K/UL (ref 0–0.4)
EOSINOPHIL NFR BLD: 1.5 % (ref 0–7)
ERYTHROCYTE [DISTWIDTH] IN BLOOD BY AUTOMATED COUNT: 13.7 % (ref 11.5–14.5)
GLOBULIN SER CALC-MCNC: 3.4 G/DL (ref 2–4)
GLUCOSE BLD STRIP.AUTO-MCNC: 101 MG/DL (ref 65–117)
GLUCOSE BLD STRIP.AUTO-MCNC: 130 MG/DL (ref 65–117)
GLUCOSE SERPL-MCNC: 86 MG/DL (ref 65–100)
HCT VFR BLD AUTO: 31 % (ref 36.6–50.3)
HGB BLD-MCNC: 10.3 G/DL (ref 12.1–17)
IMM GRANULOCYTES # BLD AUTO: 0.03 K/UL (ref 0–0.04)
IMM GRANULOCYTES NFR BLD AUTO: 0.5 % (ref 0–0.5)
LYMPHOCYTES # BLD: 1.1 K/UL (ref 0.8–3.5)
LYMPHOCYTES NFR BLD: 20 % (ref 12–49)
MAGNESIUM SERPL-MCNC: 1.8 MG/DL (ref 1.6–2.4)
MCH RBC QN AUTO: 30.5 PG (ref 26–34)
MCHC RBC AUTO-ENTMCNC: 33.2 G/DL (ref 30–36.5)
MCV RBC AUTO: 91.7 FL (ref 80–99)
MONOCYTES # BLD: 0.54 K/UL (ref 0–1)
MONOCYTES NFR BLD: 9.8 % (ref 5–13)
NEUTS SEG # BLD: 3.72 K/UL (ref 1.8–8)
NEUTS SEG NFR BLD: 67.7 % (ref 32–75)
NRBC # BLD: 0 K/UL (ref 0–0.01)
NRBC BLD-RTO: 0 PER 100 WBC
PHOSPHATE SERPL-MCNC: 3.7 MG/DL (ref 2.6–4.7)
PLATELET # BLD AUTO: 154 K/UL (ref 150–400)
PMV BLD AUTO: 10.6 FL (ref 8.9–12.9)
POTASSIUM SERPL-SCNC: 3.9 MMOL/L (ref 3.5–5.1)
PROT SERPL-MCNC: 6.9 G/DL (ref 6.4–8.2)
RBC # BLD AUTO: 3.38 M/UL (ref 4.1–5.7)
SERVICE CMNT-IMP: ABNORMAL
SERVICE CMNT-IMP: NORMAL
SODIUM SERPL-SCNC: 132 MMOL/L (ref 136–145)
WBC # BLD AUTO: 5.5 K/UL (ref 4.1–11.1)

## 2025-04-15 PROCEDURE — 99221 1ST HOSP IP/OBS SF/LOW 40: CPT

## 2025-04-15 PROCEDURE — 6360000002 HC RX W HCPCS: Performed by: NURSE ANESTHETIST, CERTIFIED REGISTERED

## 2025-04-15 PROCEDURE — C1726 CATH, BAL DIL, NON-VASCULAR: HCPCS | Performed by: STUDENT IN AN ORGANIZED HEALTH CARE EDUCATION/TRAINING PROGRAM

## 2025-04-15 PROCEDURE — 3600007512: Performed by: STUDENT IN AN ORGANIZED HEALTH CARE EDUCATION/TRAINING PROGRAM

## 2025-04-15 PROCEDURE — 0DB48ZX EXCISION OF ESOPHAGOGASTRIC JUNCTION, VIA NATURAL OR ARTIFICIAL OPENING ENDOSCOPIC, DIAGNOSTIC: ICD-10-PCS | Performed by: STUDENT IN AN ORGANIZED HEALTH CARE EDUCATION/TRAINING PROGRAM

## 2025-04-15 PROCEDURE — 6360000002 HC RX W HCPCS: Performed by: STUDENT IN AN ORGANIZED HEALTH CARE EDUCATION/TRAINING PROGRAM

## 2025-04-15 PROCEDURE — 84100 ASSAY OF PHOSPHORUS: CPT

## 2025-04-15 PROCEDURE — 83735 ASSAY OF MAGNESIUM: CPT

## 2025-04-15 PROCEDURE — 80053 COMPREHEN METABOLIC PANEL: CPT

## 2025-04-15 PROCEDURE — 3600007502: Performed by: STUDENT IN AN ORGANIZED HEALTH CARE EDUCATION/TRAINING PROGRAM

## 2025-04-15 PROCEDURE — 3700000000 HC ANESTHESIA ATTENDED CARE: Performed by: STUDENT IN AN ORGANIZED HEALTH CARE EDUCATION/TRAINING PROGRAM

## 2025-04-15 PROCEDURE — 85025 COMPLETE CBC W/AUTO DIFF WBC: CPT

## 2025-04-15 PROCEDURE — 2709999900 HC NON-CHARGEABLE SUPPLY: Performed by: STUDENT IN AN ORGANIZED HEALTH CARE EDUCATION/TRAINING PROGRAM

## 2025-04-15 PROCEDURE — 2500000003 HC RX 250 WO HCPCS: Performed by: STUDENT IN AN ORGANIZED HEALTH CARE EDUCATION/TRAINING PROGRAM

## 2025-04-15 PROCEDURE — 6370000000 HC RX 637 (ALT 250 FOR IP): Performed by: PHYSICIAN ASSISTANT

## 2025-04-15 PROCEDURE — 7100000010 HC PHASE II RECOVERY - FIRST 15 MIN: Performed by: STUDENT IN AN ORGANIZED HEALTH CARE EDUCATION/TRAINING PROGRAM

## 2025-04-15 PROCEDURE — 82962 GLUCOSE BLOOD TEST: CPT

## 2025-04-15 PROCEDURE — 36415 COLL VENOUS BLD VENIPUNCTURE: CPT

## 2025-04-15 PROCEDURE — 88305 TISSUE EXAM BY PATHOLOGIST: CPT

## 2025-04-15 PROCEDURE — 3700000001 HC ADD 15 MINUTES (ANESTHESIA): Performed by: STUDENT IN AN ORGANIZED HEALTH CARE EDUCATION/TRAINING PROGRAM

## 2025-04-15 PROCEDURE — 6370000000 HC RX 637 (ALT 250 FOR IP): Performed by: STUDENT IN AN ORGANIZED HEALTH CARE EDUCATION/TRAINING PROGRAM

## 2025-04-15 PROCEDURE — 7100000011 HC PHASE II RECOVERY - ADDTL 15 MIN: Performed by: STUDENT IN AN ORGANIZED HEALTH CARE EDUCATION/TRAINING PROGRAM

## 2025-04-15 PROCEDURE — 0DB68ZX EXCISION OF STOMACH, VIA NATURAL OR ARTIFICIAL OPENING ENDOSCOPIC, DIAGNOSTIC: ICD-10-PCS | Performed by: STUDENT IN AN ORGANIZED HEALTH CARE EDUCATION/TRAINING PROGRAM

## 2025-04-15 RX ORDER — GLIPIZIDE 5 MG/1
2.5 TABLET ORAL
Qty: 30 TABLET | Refills: 3 | Status: SHIPPED | OUTPATIENT
Start: 2025-04-15

## 2025-04-15 RX ORDER — OMEPRAZOLE 20 MG/1
20 CAPSULE, DELAYED RELEASE ORAL 2 TIMES DAILY
Qty: 30 CAPSULE | Refills: 3 | Status: SHIPPED | OUTPATIENT
Start: 2025-04-15

## 2025-04-15 RX ORDER — AMLODIPINE BESYLATE 10 MG/1
10 TABLET ORAL DAILY
Qty: 30 TABLET | Refills: 3 | Status: SHIPPED | OUTPATIENT
Start: 2025-04-16

## 2025-04-15 RX ORDER — WARFARIN SODIUM 1 MG/1
4 TABLET ORAL
Status: DISCONTINUED | OUTPATIENT
Start: 2025-04-15 | End: 2025-04-15 | Stop reason: HOSPADM

## 2025-04-15 RX ORDER — AMLODIPINE BESYLATE 5 MG/1
10 TABLET ORAL DAILY
Status: DISCONTINUED | OUTPATIENT
Start: 2025-04-15 | End: 2025-04-15 | Stop reason: HOSPADM

## 2025-04-15 RX ORDER — SUCRALFATE 1 G/1
1 TABLET ORAL 2 TIMES DAILY
Qty: 60 TABLET | Refills: 3 | Status: SHIPPED | OUTPATIENT
Start: 2025-04-15

## 2025-04-15 RX ORDER — FLECAINIDE ACETATE 50 MG/1
50 TABLET ORAL EVERY 12 HOURS SCHEDULED
Qty: 60 TABLET | Refills: 3 | Status: SHIPPED | OUTPATIENT
Start: 2025-04-15

## 2025-04-15 RX ADMIN — PANTOPRAZOLE SODIUM 40 MG: 40 TABLET, DELAYED RELEASE ORAL at 10:32

## 2025-04-15 RX ADMIN — ALLOPURINOL 300 MG: 100 TABLET ORAL at 10:31

## 2025-04-15 RX ADMIN — PROPOFOL 50 MG: 10 INJECTION, EMULSION INTRAVENOUS at 09:18

## 2025-04-15 RX ADMIN — PROPOFOL 50 MG: 10 INJECTION, EMULSION INTRAVENOUS at 09:27

## 2025-04-15 RX ADMIN — AMLODIPINE BESYLATE 10 MG: 5 TABLET ORAL at 10:46

## 2025-04-15 RX ADMIN — LIDOCAINE HYDROCHLORIDE 100 MG: 20 INJECTION, SOLUTION EPIDURAL; INFILTRATION; INTRACAUDAL; PERINEURAL at 09:18

## 2025-04-15 RX ADMIN — SODIUM CHLORIDE, PRESERVATIVE FREE 10 ML: 5 INJECTION INTRAVENOUS at 10:32

## 2025-04-15 RX ADMIN — PROPOFOL 50 MG: 10 INJECTION, EMULSION INTRAVENOUS at 09:23

## 2025-04-15 RX ADMIN — PROPOFOL 40 MG: 10 INJECTION, EMULSION INTRAVENOUS at 09:30

## 2025-04-15 RX ADMIN — PROPOFOL 50 MG: 10 INJECTION, EMULSION INTRAVENOUS at 09:20

## 2025-04-15 RX ADMIN — CARVEDILOL 25 MG: 12.5 TABLET, FILM COATED ORAL at 07:14

## 2025-04-15 RX ADMIN — ENOXAPARIN SODIUM 105 MG: 150 INJECTION SUBCUTANEOUS at 10:31

## 2025-04-15 NOTE — DISCHARGE SUMMARY
symptoms like dysuria or increased frequency.  No recent fever.     Review of systems-negative for chest pain no palpitation no shortness of breath.  No abdominal pain.  No joint pain or rash.     In the ED patient received IV fluids antiemetics and labetalol    Brief Hospital Course by Main Problems:     EVELIN likely due to dehydration/prerenal  Increased nausea   Has not been able to hydrate enough   Possible LAMONT   Patient admitted to intermediate care with tele   Creatinine on arrival-3.66>>4.01>>3.10>> 2.30  Urinalysis done 4/10/2025-mild proteinuria  Urine cr - 99.7  Ultrasound abdomen done on 4/13/2025-no hydronephrosis kidneys normal echogenicity and size  Ordered for normal saline maintenance infusion-while in hospital  Avoid nephrotoxic agents renal dose medications  Nephrology consulted-advised to continue to monitor.  Repeat BMP in about a week  Needs nephrology follow up      Hypertensive urgency on arrival  OF hypertension on irbesartan and carvedilol at home  Blood pressure 201/93  EKG on arrival normal sinus rhythm heart rate of 60 bpm first-degree heart block  S/p nicardipine   Will continue carvedilol-added amlodipine on 4/15/2025  Irbesartan-formulary substitution losartan currently held because of poor kidney function     Persistent nausea and vomiting  Possible gastroparesis from DM   Had recent ED visit for the same  CT scan of abdomen pelvis done 4/10/2025--no acute abnormality  Will start Compazine-mentioned Zofran did not help  Nausea has improved and patient tolerating diet   GI followed up advised for ultrasound liver and gallbladder-no active pathology.   Status post upper GI endoscopy done 4/15/2025:  Esophageal stenosis- biopsied, dilated to 18mm. Small mucosal tear noted consistent with therapeutic effect, no bleeding, no perforation.  Hiatal hernia  Winston's erosions- potential cause of bleeding  Mild non-erosive gastritis- potential cause of bleeding. Biopsied.   Otherwise  cyanosis.  No clubbing,     Skin turgor normal, Radial dial pulse 2+. Capillary refill normal  Neurologic:      No facial asymmetry. No aphasia or slurred speech. Symmetrical strength, Sensation grossly intact. AAOx4.           Discharge/Recent Laboratory Results:  Recent Labs     04/15/25  0511   *   K 3.9      CO2 25   BUN 23*   CREATININE 2.30*   GLUCOSE 86   CALCIUM 8.9   PHOS 3.7   MG 1.8     Recent Labs     04/15/25  0511   HGB 10.3*   HCT 31.0*   WBC 5.5          Discharge Medications:     Medication List        START taking these medications      amLODIPine 10 MG tablet  Commonly known as: NORVASC  Take 1 tablet by mouth daily  Start taking on: April 16, 2025     glipiZIDE 5 MG tablet  Commonly known as: GLUCOTROL  Take 0.5 tablets by mouth 2 times daily (before meals)     sucralfate 1 GM tablet  Commonly known as: Carafate  Take 1 tablet by mouth in the morning and 1 tablet in the evening.            CHANGE how you take these medications      flecainide 50 MG tablet  Commonly known as: TAMBOCOR  Take 1 tablet by mouth every 12 hours  What changed:   medication strength  how much to take  when to take this     omeprazole 20 MG delayed release capsule  Commonly known as: PRILOSEC  Take 1 capsule by mouth in the morning and 1 capsule in the evening.  What changed: when to take this            CONTINUE taking these medications      allopurinol 300 MG tablet  Commonly known as: ZYLOPRIM     atorvastatin 20 MG tablet  Commonly known as: LIPITOR     carvedilol 25 MG tablet  Commonly known as: COREG     ondansetron 4 MG disintegrating tablet  Commonly known as: ZOFRAN-ODT  Take 1 tablet by mouth 3 times daily as needed for Nausea or Vomiting     * warfarin 3 MG tablet  Commonly known as: COUMADIN     * warfarin 4 MG tablet  Commonly known as: COUMADIN           * This list has 2 medication(s) that are the same as other medications prescribed for you. Read the directions carefully, and ask your

## 2025-04-15 NOTE — H&P
See note from 4/14/25.  No interim changes.    LIZBETH Negrete D.O.  [facility]

## 2025-04-15 NOTE — OP NOTE
[facility]  LIZBETH Negrete D.OVal  [phone]             Esophagogastroduodenoscopy (EGD) Procedure Note    NAME: Abner Butler  :  1950  MRN:  [id number]    Indications:  nausea, melena      : Naun Negrete DO    Referring Provider:  Obi Dos Santos MD    Staff: Circulator: Jaida Cruz RN  Endoscopy Technician: Rito Spencer    Prosthetic devices, grafts, tissues, transplant, or devices implanted: none    Medicine:  MAC anesthesia Propofol      Procedure Details:  After informed consent was obtained with all risks and benefits of the procedure explained and preprocedure exam completed, the patient was placed in the left lateral decubitus position.  Universal protocol for patient identification was performed and documented in the nursing notes.  Throughout the procedure, the patient's blood pressure was monitored at least every five minutes; pulse, and oxygen saturations were monitored continuously.  All vital signs were documented in the nursing notes.  The endoscope was inserted into the mouth and advanced under direct vision to second portion of the duodenum.  A careful inspection was made as the gastroscope was withdrawn, including a retroflexed view of the proximal stomach; findings and interventions are described below.      Findings:   Esophagus:  Esophageal stenosis/stricture noted at GEJ. TTS balloon passed through the stricture (15 mm to 18 mm in size), and inflated to 18 mm. The stricture was examined post-dilation and small mucosal tear noted at GEJ. NO deep tear, NO perforation. Mild self-limited bleeding.  Biopsies taken of stricture as well to rule out malignancy     Stomach:   GE Flap Valve Grade IV  Winston's erosion noted in the hiatal hernia  Mild non-erosive gastritis with stigmata of bleeding  Otherwise, normal mucosa noted in the entire stomach. Biopsies taken from antrum and body to rule out pathology such as H pylori

## 2025-04-15 NOTE — PROGRESS NOTES
Pharmacist Daily Dosing of Warfarin    Indication & Goal INR: AFib, INR Goal 2-3    PTA Warfarin Dose: 4 mg two times per week and 3 mg all other days.     Notable concurrent conditions and medications: None    Labs:  Recent Labs     Units 04/15/25  0511 04/14/25  0641 04/13/25  0359 04/12/25  2131   INR    --   --   --  1.2*   HGB g/dL 10.3* 9.9* 9.9*  --    PLT K/uL 154 142* 143*  --          Impression/Plan:   Will order warfarin 4 mg PO x 1 dose.  Bridging with Lovenox.   Daily INR has been ordered  CBC w/o differential every other day has been ordered     Pharmacy will follow daily and adjust the dose as appropriate.    Thank you,  Abdoulaye Coats [facility]      Warfarin Protocol    Located on pharmacy Teams site: Clinical Practice -> Anticoagulation & Cardiology -> Anticoagulation Policies, Protocols, Guidance

## 2025-04-15 NOTE — PROGRESS NOTES
ARRIVAL INFORMATION:  Verified patient name and date of birth, scheduled procedure, and informed consent.       Belongings with patient include:  Upper dentures, glasses, phone (no tele box)    GI FOCUSED ASSESSMENT:  Neuro: Awake, alert, oriented x4  Respiratory: even and unlabored   GI: soft and non-distended  EKG Rhythm: normal sinus rhythm    Education:Reviewed general discharge instructions and  information. The risks and benefits of the bite block have been explained to patient.  Patient verbalizes understanding.

## 2025-04-15 NOTE — PLAN OF CARE
Problem: Discharge Planning  Goal: Discharge to home or other facility with appropriate resources  Outcome: Progressing  Flowsheets (Taken 4/15/2025 0228)  Discharge to home or other facility with appropriate resources:   Identify barriers to discharge with patient and caregiver   Arrange for needed discharge resources and transportation as appropriate   Identify discharge learning needs (meds, wound care, etc)   Refer to discharge planning if patient needs post-hospital services based on physician order or complex needs related to functional status, cognitive ability or social support system     Problem: Safety - Adult  Goal: Free from fall injury  Outcome: Progressing     Problem: ABCDS Injury Assessment  Goal: Absence of physical injury  Outcome: Progressing     Problem: Pain  Goal: Verbalizes/displays adequate comfort level or baseline comfort level  Outcome: Progressing  Flowsheets (Taken 4/15/2025 0228)  Verbalizes/displays adequate comfort level or baseline comfort level:   Encourage patient to monitor pain and request assistance   Assess pain using appropriate pain scale   Administer analgesics based on type and severity of pain and evaluate response   Implement non-pharmacological measures as appropriate and evaluate response     Problem: Cardiovascular - Adult  Goal: Maintains optimal cardiac output and hemodynamic stability  Outcome: Progressing  Flowsheets (Taken 4/15/2025 0228)  Maintains optimal cardiac output and hemodynamic stability:   Monitor blood pressure and heart rate   Monitor urine output and notify Licensed Independent Practitioner for values outside of normal range   Assess for signs of decreased cardiac output   Administer fluid and/or volume expanders as ordered  Goal: Absence of cardiac dysrhythmias or at baseline  Outcome: Progressing  Flowsheets (Taken 4/15/2025 0228)  Absence of cardiac dysrhythmias or at baseline:   Monitor cardiac rate and rhythm   Assess for signs of decreased

## 2025-04-15 NOTE — PROGRESS NOTES
CRE balloon dilatation of the esophagus   15 mm Balloon inflated to 3 ATMs and held for 15 seconds.  16.5 mm Balloon inflated to 4.5 ATMs and held for 10 seconds.  18 mm Balloon inflated to 7 ATMs and held for 30 seconds.    18 mm Balloon inflated to 7 ATMs and held for 60 seconds.    No subcutaneous crepitus of the chest or cervical region was noted post dilatation.

## 2025-04-15 NOTE — PROGRESS NOTES
Report received from Alethea SNEED on patient for endoscopy procedure. Will send for patient at 8 for 9 case.

## 2025-04-15 NOTE — ANESTHESIA POSTPROCEDURE EVALUATION
Department of Anesthesiology  Postprocedure Note    Patient: Abner Butler  MRN: [id number]  YOB: 1950  Date of evaluation: 4/15/2025    Procedure Summary       Date: 04/15/25 Room / Location: [facility] ENDO 02 / [facility] ENDOSCOPY    Anesthesia Start: 0914 Anesthesia Stop: 0942    Procedure: ESOPHAGOGASTRODUODENOSCOPY Diagnosis:       Family history of colon cancer      Melena      Blood in stool      Anemia, unspecified type      Occult blood in stools      (Family history of colon cancer [Z80.0])      (Melena [K92.1])      (Blood in stool [K92.1])      (Anemia, unspecified type [D64.9])      (Occult blood in stools [R19.5])    Surgeons: Naun Negrete DO Responsible Provider: Timothy Hoffman MD    Anesthesia Type: MAC ASA Status: 2            Anesthesia Type: MAC    Wilberto Phase I: Wilberto Score: 10    Wilberto Phase II:      Anesthesia Post Evaluation    Patient location during evaluation: PACU  Patient participation: complete - patient participated  Level of consciousness: awake  Airway patency: patent  Nausea & Vomiting: no vomiting  Cardiovascular status: hemodynamically stable  Respiratory status: acceptable  Hydration status: euvolemic    No notable events documented.

## 2025-04-15 NOTE — PROGRESS NOTES
Discharge Summary    Name: Abner Butler  [id number]  YOB: 1950 (Age: 74 y.o.)   Date of Admission: 4/12/2025  Date of Discharge: 4/15/2025  Attending Physician: Jaxson Bowie MD    Discharge Diagnosis:   EVELIN likely due to dehydration/prerenal  Increased nausea   Has not been able to hydrate enough   Possible LAMONT   Hypertensive urgency on arrival  OF hypertension on irbesartan and carvedilol at home  Persistent nausea and vomiting  Possible gastroparesis from DM   Acute hyponatremia likely due to hypovolemia  POSSIBLE Siadh FROM nausea   Known case of diabetes on metformin and pioglitazone at home  Known case of A-fib on flecainide and Warfarin  Sinus pauses   Known case of gout     Consultations:  IP CONSULT TO PHARMACY  IP CONSULT TO GI  IP CONSULT TO NEPHROLOGY  IP CONSULT TO CARDIOLOGY      Brief Admission History/Reason for Admission Per Jaxson Bowie MD:   Abner Butler is a 74 y.o.  male with PMHx significant as below came to the ed - for complains of persistent nausea - since last year . Getting worse for last 2 days .   He was here in the ed - 2 days ago- for same reason and sent home with nausea meds .  Patient mentioned he had not been able to have control in nausea even with the meds . He is not able to eat or drink because of the same - more so in last 2 days . No excessive vomiting with it . No complains of diarrhoea .   Patient also had been having occasional headache and no complains of early morning vomiting / projectile vomiting . No complains of focal neurological deficits   No complains of early satiety / abdominal distension . No jaundice .   He has diabetes and is taking metformin and pioglitazone.  He mentions his A1c is well-controlled.  He is also known case of hypertension on blood pressure medications.  He has never noticed any change in urine color or volume.  No complaints of any urinary symptoms like dysuria or increased frequency.

## 2025-04-15 NOTE — PROGRESS NOTES
Endoscopy recovery  Patient returned to baseline, vital signs stable (see vital sign flowsheet). Patient offered liquids and tolerated well. Respiratory status within defined limits. Abdomen soft not tender. Skin with in defined limits.     Patient transported back to RM 2309 via transport/stretcher at 1000.

## 2025-04-15 NOTE — DIABETES MGMT
[facility]ES HEALTH  DIABETES MANAGEMENT CONSULT    Consulted by Provider for advanced nursing evaluation and care for inpatient blood glucose management.    Evaluation and Action Plan   Abner Butler is a 74 year old male patient with a hx of Type 2 diabetes. He takes Metformin and pioglitazone at home. He reports good Bg control at home. However, the patient has developed EVELIN and will require some medications changes. He will follow-up with nephrology outpatient.   The patient was admitted after experiencing nausea and loss of sleep for several days. The patient found to be hypertensive. He will follow-up with cardiac. He also had an endoscopy while admitted and will follow-up with GI outpatient.     Blood glucose pattern              Diabetes Discharge Plan   Medication  Glipizide 2.5 mg BID with meals and follow-up with PCP for future diabetes management.     Discussed how to take medication and also discussed how to treat hypoglycemia.             Initial Presentation   Abner Butler is a 74 y.o. male who presented to the ED on 4/12/25.   LAB: Glucose 113. Creatine 3.66. GFR 17. A1c  Narrative & Impression  INDICATION: Epigastric fullness, persistent nausea, hematochezia, eval for mass     COMPARISON: 12/29/2012     TECHNIQUE:   Thin axial images were obtained through the abdomen and pelvis with intravenous  iodinated contrast administration. Coronal and sagittal reconstructions were  generated. Oral contrast was not administered. CT dose reduction was achieved  through use of a standardized protocol tailored for this examination and  automatic exposure control for dose modulation.      FINDINGS:      LIVER: No mass or biliary dilatation.   GALLBLADDER: No calcified gallstone  SPLEEN: Unremarkable  PANCREAS: No mass or ductal dilatation.  ADRENALS: Unremarkable.  KIDNEYS/URETERS: Bilateral renal cortical atrophy and scarring. No abnormal  enhancing mass. No hydronephrosis.  PERITONEUM: No  abdominal lymphadenopathy or ascites.  COLON: Diverticulosis  APPENDIX: Unremarkable.  SMALL BOWEL: No dilatation or wall thickening.  STOMACH: Mild hiatal hernia. Posterior gastric fundal diverticulum.  PELVIS: No pelvic lymphadenopathy or free fluid.  BONES: Moderate degenerative changes in the spine  VISUALIZED THORAX: No significant abnormality  ADDITIONAL COMMENTS: N/A     IMPRESSION:        1. No acute abnormality.       HX:   Past Medical History:   Diagnosis Date    A-fib (HCC)     Acid reflux     Anemia     BRBPR (bright red blood per rectum)     Diverticulosis     Gout     HLD (hyperlipidemia)     HTN (hypertension)         INITIAL DX: Hypochloremia [E87.8]  Hyponatremia [E87.1]  EVELIN (acute kidney injury) [N17.9]  History of hiatal hernia [Z87.19]  Intractable nausea [R11.0]     Current Treatment     TX: allopurinol. BP management. Clot prevention. Protonix. Warfarin.     Hospital Course   Clinical progress has been complicated by EVELIN.     Diabetes History   Type Diabetes- Type 2  Ambulatory BG management provided by: PCP      No results found for: \"LABA1C\"  Diabetes-related Medical History      Diabetes Medication History  Diabetes drug class Diabetes drug name Additional Comments   Biguanide  Metformin    Thiazolidinediones Actos      Diabetes self-management practices:   Eating pattern   [] Eating a carbohydrate-controlled meal plan     Physical activity pattern   [] Employing a physical activity program to control BG    Monitoring pattern   [x] Testing BGs sufficiently to inform self-management adjustments      Taking medications pattern  [x] Consistent administration  [x] Affordable      Social determinants of health impacting diabetes self-management practices    Concerned that you need to know more about how to stay healthy with diabetes    Overall evaluation:    [x] Not achieving A1c target with drug therapy & self-care practices    Subjective   ”I've been taking Metformin and Actos for years.

## 2025-04-15 NOTE — CARE COORDINATION
CLEAR FROM CM STANDPOINT    Transition of Care Plan:    RUR: 16% Moderate RUR  Prior Level of Functioning:  Independent in ADLs/IADLs  Disposition: home with family  REVA: 4/15  If SNF or IPR: Date FOC offered: na  Date FOC received:   Accepting facility:   Date authorization started with reference number:   Date authorization received and expires:   Follow up appointments: pcp/specialist   DME needed: none  Transportation at discharge: The patient' family will provide a ride. Kathy Butler (Spouse) [phone]  IM/IMM Medicare/ letter given: 2nd IM 4/15  Is patient a [place] and connected with [facility]? na   If yes, was [place] transfer form completed and [facility] notified? na  Caregiver Contact: Kathy Butler (Spouse)  [phone]  Discharge Caregiver contacted prior to discharge? contacted  Care Conference needed? no  Barriers to discharge:     Patient verbalized understanding and gave permission for possible discharge within 4 hours of receiving IMM    The  (CM) noted no futher needs or concerns. The patient agrees with the discharge plan. The patient's family will provide  transportation upon discharge. The primary registered nurse (RN) has been informed that the patient is cleared from the case management perspective.     04/15/25 1429   Services At/After Discharge   Transition of Care Consult (CM Consult) Discharge Planning   Services At/After Discharge None    Resource Information Provided? No   Mode of Transport at Discharge Other (see comment)  (Caregiver Contact: Kathy Butler (Spouse)  [phone])   Confirm Follow Up Transport Self       the patient is alert, able to communicate  needs effectively,  independent in ADLS/IADLS, and able to drive. The patient resides in a senior community resident that is two levels but stays on the main level. The patient does not use any DMEs. The patient' family will provide a ride. Alexandria Butlera (Spouse) [phone]      Marilyn Hill RN  Case

## 2025-04-15 NOTE — PROGRESS NOTES
Nephrology Consult Note      Assessment:  EVELIN on CKD-3a  -creatinine better from 4.09 yesterday to 3.1 today.  Urine output also picked up    Hyponatremia: Na 125-> 126.=> 130-better multifactorial-> GFR loss/Nausea stimulating ADH release    N/V: acute on chronic issue    HTN: elevated on presentation-> now fair    Afib    Plan/Recommendations:  Creatinine is down to 2.3.  I agree with discharge plan today.  Discussed with hospitalist team.  Agree that we need to hold metformin  I can arrange follow-up for him within a week's time and will do renal panel at that time.  At that time we could either reinstate metformin or suggest alternative medication    Romario Kay MD  [facility] ([facility])     established patient visit note    Subjective  Feeling better today.  About to go home.  Had endoscopy yesterday.    PMH:  Past Medical History:   Diagnosis Date    A-fib (HCC)     Acid reflux     Anemia     BRBPR (bright red blood per rectum)     Diverticulosis     Gout     HLD (hyperlipidemia)     HTN (hypertension)       PSH:  Past Surgical History:   Procedure Laterality Date    CARDIAC ELECTROPHYSIOLOGY MAPPING AND ABLATION      CATARACT REMOVAL      COLONOSCOPY      ESOPHAGOGASTRODUODENOSCOPY      TONSILLECTOMY      UMBILICAL HERNIA REPAIR          Social history:   Social History       Tobacco History       Smoking Status  Former Current Packs/Day  1 pack/day Average Packs/Day  1 pack/day for 20.0 years (20.0 ttl pk-yrs) Smoking Tobacco Type  Cigarettes   Pack Year History     Packs/Day From To Years    1   20.0      Passive Exposure  Never      Smokeless Tobacco Use  Never              Alcohol History       Alcohol Use Status  Yes Drinks/Week  1 Cans of beer per week Amount  1.0 standard drink of alcohol/wk Comment  one beer every once in a while              Drug Use       Drug Use Status  Never              Sexual Activity       Sexually Active  Not Asked                     Family

## 2025-04-15 NOTE — PROGRESS NOTES
End of Shift Note    Bedside shift change report given to Christoph SNEED (oncoming nurse) by Alethea Sarmiento RN (offgoing nurse).  Report included the following information SBAR, Intake/Output, MAR, Recent Results, and Cardiac Rhythm NSR 1st degree AVB    Shift worked:  7p-7a     Shift summary and any significant changes:    Nephro approved pausing IVF overnight per pt request- he reported excessive voiding and pump alarms prior night prevented sleep. Reports still voiding frequently and not much sleep last night either.   NPO since 0000 for EGD- report given to endo pre-op RN; EGD at 0900. Requested to administer coreg prior to procedure at 0800. Consent signed and on chart   Concerns for physician to address:       Zone phone for oncoming shift:   [phone]       Activity:  Level of Assistance: Independent  Number times ambulated in hallways past shift: 1  Number of times OOB to chair past shift: 2    Cardiac:   Cardiac Monitoring: Refused    (order was discontinued on dayshift)  Cardiac Rhythm: 1° AV Block, BBB    Access:  Current line(s): PIV     Genitourinary:   Urinary Status: Voiding, Bathroom privileges    Respiratory:   O2 Device: None (Room air)  Chronic home O2 use?: NO  Incentive spirometer at bedside: NO    GI:  Last BM (including prior to admit): 04/11/25  Current diet:  Diet NPO  Passing flatus: YES    Pain Management:   Patient states pain is manageable on current regimen: N/A    Skin:  Pravin Scale Score: 20  Interventions: Wound Offloading (Prevention Methods): Bed, pressure reduction mattress, Blankets, Pillows, Turning, Elevate heels    Patient Safety:  Fall Risk: Nursing Judgement-Fall Risk High(Add Comments): Yes  Fall Risk Interventions  Nursing Judgement-Fall Risk High(Add Comments): Yes  Toilet Every 2 Hours-In Advance of Need: Yes  Hourly Visual Checks: Awake  Fall Visual Posted: Socks, Fall sign posted  Room Door Open: Deferred to promote rest  Alarm On: Other (Comment) (bed alarm refusal

## 2025-04-15 NOTE — PROGRESS NOTES
[facility]        Progress note    NAME: Abner Butler   :  1950   MRN:  [id number]     Date/Time:  4/15/2025 12:26 PM    Patient PCP: Obi Dos Santos MD  ________________________________________________________________________     Assessment:     Problem list:  1. Recurrent atrial fibrillation, persistent and symptomatic.  Didn't tolerate oral amiodarone.  Multaq had early failure.  Now on warfarin, stopped Xarelto due to cost.  He had a PVI for Afib and also RA flutter line 3/2016.  HOLDING SINUS RHYTHM ON MEDICAL THERAPY NOW.  2. Conduction disorder, other (prolonged QT after ibutilide on 10/16).  3. Sinus bradycardia and first degree AV block on high dose beta-blocker, asymptomatic to date.  4.  PVC's.  5. Chronic RBBB.  6. Hypertension, essential.  7. Hypercholesterolemia.  8. Diabetes type 2 without mention of complications.  9. H/o GI (diverticular) bleed in remote past.  H&H normal on prior check.    2024 EP update:  Denies syncope, dizziness, palpitations.  No chest, jaw, neck, shoulder, or arm pain.  No orthopnea, PND, or edema. Patient denies claudication. There is no discoloration or ulceration of the lower extremities. The patient has had no TIA or stroke-like symptoms. No significant change since last visit.  No hospitalizations or surgeries recently but anticipates a screening colonoscopy.    2025  Persistent nausea unclear  EVELIN  Sinus pause on tele    Cardiologist:  Gustavo        Plan:     - No chest pain  - Dry  - Sinus with blocked PAC, has first degree AV block, RBBB    Update echo    - Holding warfarin for GI procedures, currently in sinus  - Decrease flecainide from 100mg bid to 50mg bid, ?contributing to nausea  - Cont carvedilol  - Holding irbesartan until renal function improves  - Cont hydration, monitor bmp  - Cont atorvastatin     update:  His flecainide was reduced.  I'll hold it entirely.  Should wear off in 3-4 days.  See if this is

## 2025-04-15 NOTE — PROGRESS NOTES
Hospital follow-up PCP transitional care appointment has been scheduled with Dr. Obi Dos Santos on 4/22/25 at 1200 . This is the first available appt due to limited provider availability. PCP office does not offer alternate provider option for hospital follow up. [facility] placed [facility] information AVS for patient resource. Pending patient discharge. Leonie Franco, Care Management Assistant

## 2025-04-15 NOTE — PROGRESS NOTES
[facility]        Progress note    NAME: Abner Butler   :  1950   MRN:  [id number]     Date/Time:  4/15/2025 7:40 AM    Patient PCP: Obi Dos Santos MD  ________________________________________________________________________     Assessment:     Problem list:  1. Recurrent atrial fibrillation, persistent and symptomatic.  Didn't tolerate oral amiodarone.  Multaq had early failure.  Now on warfarin, stopped Xarelto due to cost.  He had a PVI for Afib and also RA flutter line 3/2016.  HOLDING SINUS RHYTHM ON MEDICAL THERAPY NOW.  2. Conduction disorder, other (prolonged QT after ibutilide on 10/16).  3. Sinus bradycardia and first degree AV block on high dose beta-blocker, asymptomatic to date.  4.  PVC's.  5. Chronic RBBB.  6. Hypertension, essential.  7. Hypercholesterolemia.  8. Diabetes type 2 without mention of complications.  9. H/o GI (diverticular) bleed in remote past.  H&H normal on prior check.    2024 EP update:  Denies syncope, dizziness, palpitations.  No chest, jaw, neck, shoulder, or arm pain.  No orthopnea, PND, or edema. Patient denies claudication. There is no discoloration or ulceration of the lower extremities. The patient has had no TIA or stroke-like symptoms. No significant change since last visit.  No hospitalizations or surgeries recently but anticipates a screening colonoscopy.    2025  Persistent nausea unclear  EVELIN  Sinus pause on tele    Cardiologist:  Gustavo        Plan:     - No chest pain  - Dry  - Sinus with blocked PAC, has first degree AV block, RBBB    Update echo    - Holding warfarin for GI procedures, currently in sinus  - Decrease flecainide from 100mg bid to 50mg bid, ?contributing to nausea  - Cont carvedilol  - Holding irbesartan until renal function improves  - Cont hydration, monitor bmp  - Cont atorvastatin     update:  His flecainide was reduced.  I'll hold it entirely.  Should wear off in 3-4 days.  See if this is

## 2025-04-15 NOTE — PROGRESS NOTES
Endoscopy Case End Note:     Procedure scope was pre-cleaned, per protocol, at bedside by DANIAL Spencer.       Report received from anesthesia.  See anesthesia flowsheet for intra-procedure vital signs and events.    Glasses, Denture and cell phone returned to patient. Belongings remain under stretcher with patient.

## (undated) DEVICE — SET ADMIN 16ML TBNG L100IN 2 Y INJ SITE IV PIGGY BK DISP

## (undated) DEVICE — BASIN EMSIS 16OZ GRAPHITE PLAS KID SHP MOLD GRAD FOR ORAL

## (undated) DEVICE — SET GRAV CK VLV NEEDLESS ST 3 GANGED 4WAY STPCOCK HI FLO 10

## (undated) DEVICE — ESOPHAGEAL BALLOON DILATATION CATHETER: Brand: CRE FIXED WIRE

## (undated) DEVICE — COVIDIEN KENDALL DL DISPOSABLE 3 LEAD SY: Brand: MEDLINE RENEWAL

## (undated) DEVICE — Z DISCONTINUED PER MEDLINE LINE GAS SAMPLING O2/CO2 LNG AD 13 FT NSL W/ TBNG FILTERLINE

## (undated) DEVICE — CATH IV AUTOGRD BC PNK 20GA 25 -- INSYTE

## (undated) DEVICE — CATHETER IV 18GA L1.16IN OD1.27-1.3462MM ID0.9398-1.016MM

## (undated) DEVICE — CATHETER IV 22GA L1IN OD0.8382-0.9144MM ID0.6096-0.6858MM 382523

## (undated) DEVICE — CATHETER IV 20GA L1.16IN OD1.0414-1.1176MM ID0.762-0.8382MM

## (undated) DEVICE — SYR 10ML LUER LOK 1/5ML GRAD --

## (undated) DEVICE — SOLIDIFIER MEDC 1200ML -- CONVERT TO 356117

## (undated) DEVICE — TOWEL 4 PLY TISS 19X30 SUE WHT

## (undated) DEVICE — Device

## (undated) DEVICE — NEONATAL-ADULT SPO2 SENSOR: Brand: NELLCOR

## (undated) DEVICE — 1200 GUARD II KIT W/5MM TUBE W/O VAC TUBE: Brand: GUARDIAN

## (undated) DEVICE — CATHETER IV 24GA L0.75IN OD0.6604-0.7366MM

## (undated) DEVICE — FORCEP BX LG CAP 2.4 MMX120 CM W/ NDL YEL RADIAL JAW 4 DISP

## (undated) DEVICE — IV START KIT: Brand: MEDLINE

## (undated) DEVICE — KENDALL RADIOLUCENT FOAM MONITORING ELECTRODE RECTANGULAR SHAPE: Brand: KENDALL

## (undated) DEVICE — SYR 3ML LL TIP 1/10ML GRAD --

## (undated) DEVICE — NEEDLE HYPO 18GA L1.5IN PNK S STL HUB POLYPR SHLD REG BVL